# Patient Record
Sex: FEMALE | Race: WHITE | NOT HISPANIC OR LATINO | Employment: FULL TIME | URBAN - METROPOLITAN AREA
[De-identification: names, ages, dates, MRNs, and addresses within clinical notes are randomized per-mention and may not be internally consistent; named-entity substitution may affect disease eponyms.]

---

## 2017-06-13 ENCOUNTER — OFFICE VISIT (OUTPATIENT)
Dept: URGENT CARE | Facility: CLINIC | Age: 32
End: 2017-06-13
Attending: EMERGENCY MEDICINE
Payer: COMMERCIAL

## 2017-06-13 ENCOUNTER — TRANSCRIBE ORDERS (OUTPATIENT)
Dept: URGENT CARE | Facility: CLINIC | Age: 32
End: 2017-06-13

## 2017-06-13 ENCOUNTER — APPOINTMENT (EMERGENCY)
Dept: RADIOLOGY | Facility: HOSPITAL | Age: 32
End: 2017-06-13
Payer: COMMERCIAL

## 2017-06-13 ENCOUNTER — HOSPITAL ENCOUNTER (EMERGENCY)
Facility: HOSPITAL | Age: 32
Discharge: HOME | End: 2017-06-13
Attending: EMERGENCY MEDICINE
Payer: COMMERCIAL

## 2017-06-13 ENCOUNTER — OFFICE VISIT (OUTPATIENT)
Dept: URGENT CARE | Facility: CLINIC | Age: 32
End: 2017-06-13
Payer: COMMERCIAL

## 2017-06-13 VITALS
HEART RATE: 75 BPM | OXYGEN SATURATION: 100 % | RESPIRATION RATE: 16 BRPM | WEIGHT: 122 LBS | SYSTOLIC BLOOD PRESSURE: 103 MMHG | DIASTOLIC BLOOD PRESSURE: 55 MMHG | TEMPERATURE: 98.1 F

## 2017-06-13 DIAGNOSIS — R07.9 CHEST PAIN, UNSPECIFIED: Primary | ICD-10-CM

## 2017-06-13 DIAGNOSIS — R07.9 CHEST PAIN, UNSPECIFIED: ICD-10-CM

## 2017-06-13 DIAGNOSIS — R09.1 PLEURISY: Primary | ICD-10-CM

## 2017-06-13 LAB — DEPRECATED D DIMER PPP: <270 NG/ML (FEU) (ref 0–424)

## 2017-06-13 PROCEDURE — 85379 FIBRIN DEGRADATION QUANT: CPT | Performed by: EMERGENCY MEDICINE

## 2017-06-13 PROCEDURE — 96374 THER/PROPH/DIAG INJ IV PUSH: CPT

## 2017-06-13 PROCEDURE — 93005 ELECTROCARDIOGRAM TRACING: CPT | Performed by: EMERGENCY MEDICINE

## 2017-06-13 PROCEDURE — 71020 HB CHEST X-RAY 2VW FRONTAL&LATL: CPT

## 2017-06-13 PROCEDURE — 93005 ELECTROCARDIOGRAM TRACING: CPT

## 2017-06-13 PROCEDURE — 99203 OFFICE O/P NEW LOW 30 MIN: CPT

## 2017-06-13 PROCEDURE — 36415 COLL VENOUS BLD VENIPUNCTURE: CPT | Performed by: EMERGENCY MEDICINE

## 2017-06-13 PROCEDURE — 99285 EMERGENCY DEPT VISIT HI MDM: CPT

## 2017-06-13 RX ORDER — KETOROLAC TROMETHAMINE 30 MG/ML
30 INJECTION, SOLUTION INTRAMUSCULAR; INTRAVENOUS ONCE
Status: COMPLETED | OUTPATIENT
Start: 2017-06-13 | End: 2017-06-13

## 2017-06-13 RX ORDER — KETOROLAC TROMETHAMINE 10 MG/1
10 TABLET, FILM COATED ORAL EVERY 6 HOURS PRN
Qty: 20 TABLET | Refills: 0 | Status: SHIPPED | OUTPATIENT
Start: 2017-06-13 | End: 2019-06-18

## 2017-06-13 RX ORDER — CETIRIZINE HYDROCHLORIDE 10 MG/1
10 TABLET ORAL DAILY
COMMUNITY
End: 2018-10-08

## 2017-06-13 RX ADMIN — KETOROLAC TROMETHAMINE 30 MG: 30 INJECTION, SOLUTION INTRAMUSCULAR at 21:59

## 2017-06-14 LAB
ATRIAL RATE: 70 BPM
ATRIAL RATE: 85 BPM
P AXIS: 65 DEGREES
P AXIS: 65 DEGREES
PR INTERVAL: 154 MS
PR INTERVAL: 160 MS
QRS AXIS: 100 DEGREES
QRS AXIS: 97 DEGREES
QRSD INTERVAL: 76 MS
QRSD INTERVAL: 78 MS
QT INTERVAL: 360 MS
QT INTERVAL: 376 MS
QTC INTERVAL: 406 MS
QTC INTERVAL: 428 MS
T WAVE AXIS: 59 DEGREES
T WAVE AXIS: 64 DEGREES
VENTRICULAR RATE: 70 BPM
VENTRICULAR RATE: 85 BPM

## 2017-09-16 ENCOUNTER — OFFICE VISIT (OUTPATIENT)
Dept: URGENT CARE | Facility: CLINIC | Age: 32
End: 2017-09-16
Payer: COMMERCIAL

## 2017-09-16 PROCEDURE — 99213 OFFICE O/P EST LOW 20 MIN: CPT

## 2018-01-04 ENCOUNTER — OFFICE VISIT (OUTPATIENT)
Dept: URGENT CARE | Facility: CLINIC | Age: 33
End: 2018-01-04
Payer: COMMERCIAL

## 2018-01-04 PROCEDURE — 99213 OFFICE O/P EST LOW 20 MIN: CPT

## 2018-01-10 NOTE — PROGRESS NOTES
Assessment   1  Acute bronchitis (466 0) (J20 9)    Plan   Acute bronchitis    · Amoxicillin-Pot Clavulanate 875-125 MG Oral Tablet; TAKE 1 TABLET EVERY 12    HOURS DAILY    Discussion/Summary   Discussion Summary:    Start Augmentin and take as directed  Concern for more bacterial infections since the fever was noted today for the first time  Continue Tylenol as needed for headache or pressure  Recommend over-the-counter Mucinex to help with symptoms  Increase fluids and drink plenty of water  Medication Side Effects Reviewed: Possible side effects of new medications were reviewed with the patient/guardian today  Understands and agrees with treatment plan: The treatment plan was reviewed with the patient/guardian  The patient/guardian understands and agrees with the treatment plan      Chief Complaint   1  Cold Symptoms  Chief Complaint Free Text Note Form: C/O sinus pressure, post nasal drip, loss of voice and harsh cough x 5 days      History of Present Illness   HPI: 82-year-old female here with complaint of sinus pressure, postnasal drip and loss of voice and harsh cough for the last 5 days  If she has been getting progressively worse and today started with a fever  Cough is getting more productive  Hospital Based Practices Required Assessment:      Pain Assessment      the patient states they have pain  The pain is located in the throat  The patient describes the pain as aching  Abuse And Domestic Violence Screen   Domestic violence screen not done today  Reason DV Screen not done: family members present     Cold Symptoms: Yeni Sheets presents with complaints of cold symptoms  Associated symptoms include nasal congestion,-- post nasal drainage,-- productive cough,-- facial pressure,-- facial pain,-- headache,-- fatigue,-- fever-- and-- chills, but-- no ear pain,-- no wheezing,-- no nausea-- and-- no vomiting        Review of Systems   Focused-Female:      Constitutional: fever,-- feeling poorly,-- chills-- and-- feeling tired  ENT: nasal discharge-- and-- hoarseness, but-- as noted in HPI  Cardiovascular: no complaints of slow or fast heart rate, no chest pain, no palpitations, no leg claudication or lower extremity edema  Respiratory: cough, but-- as noted in HPI,-- no shortness of breath-- and-- no wheezing  ROS Reviewed:    ROS reviewed  Active Problems   1  Chronic interstitial cystitis (595 1) (N30 10)    Past Medical History   1  History of Flu-like symptoms (780 99) (R68 89)   2  History of chest pain (V13 89) (Z87 898)   3  History of dysuria (V13 00) (Z87 898)   4  History of lymphadenitis (V13 89) (Z87 898)   5  History of nausea (V12 79) (Z87 898)   6  History of pleurisy (V12 69) (Z87 09)   7  History of sinusitis (V12 69) (Z87 09)   8  History of urinary tract infection (V13 02) (Z87 440)   9  No pertinent past medical history   10  History of No pertinent past surgical history   11  History of Palpitations (785 1) (R00 2)   12  History of Skin infection (686 9) (L08 9)  Active Problems And Past Medical History Reviewed: The active problems and past medical history were reviewed and updated today  Family History   Mother    1  No pertinent family history  Father    2  No pertinent family history  Family History Reviewed: The family history was reviewed and updated today  Social History    · Never a smoker   · Non-smoker (V49 89) (Z78 9)   · Social alcohol use (Z78 9)  Social History Reviewed: The social history was reviewed and updated today  The social history was reviewed and is unchanged  Surgical History   1  History of Incisional Breast Biopsy  Surgical History Reviewed: The surgical history was reviewed and updated today  Current Meds    1  CeleXA 10 MG Oral Tablet; Therapy: (Recorded:36Mzk2101) to Recorded  Medication List Reviewed: The medication list was reviewed and updated today  Allergies   1  Thorazine TABS    Vitals   Signs   Recorded: 03DPR4942 02:07PM   Temperature: 97 3 F  Heart Rate: 76  Respiration: 18  Systolic: 245  Diastolic: 68  Height: 5 ft 4 in  Weight: 122 lb 8 96 oz  BMI Calculated: 21 04  BSA Calculated: 1 6  O2 Saturation: 98    Physical Exam        Constitutional      General appearance: No acute distress, well appearing and well nourished  Ears, Nose, Mouth, and Throat      External inspection of ears and nose: Normal        Otoscopic examination: Tympanic membranes translucent with normal light reflex  Canals patent without erythema  Nasal mucosa, septum, and turbinates: Abnormal   There was a mucoid discharge from both nares  The bilateral nasal mucosa was red  Oropharynx: Abnormal   The posterior pharynx was erythematous, but-- did not have an exudate  Pulmonary      Respiratory effort: No increased work of breathing or signs of respiratory distress  Auscultation of lungs: Clear to auscultation  Cardiovascular      Auscultation of heart: Normal rate and rhythm, normal S1 and S2, without murmurs         Examination of extremities for edema and/or varicosities: Normal        Signatures    Electronically signed by : Nitza Purvis PAC; Jan 4 2018  2:40PM EST                       (Author)     Electronically signed by : CRISTOPHER Aquino ; Jan 9 2018 10:11AM EST                       (Co-author)

## 2018-01-17 ENCOUNTER — TRANSCRIBE ORDERS (OUTPATIENT)
Dept: URGENT CARE | Facility: CLINIC | Age: 33
End: 2018-01-17

## 2018-01-17 ENCOUNTER — OFFICE VISIT (OUTPATIENT)
Dept: URGENT CARE | Facility: CLINIC | Age: 33
End: 2018-01-17
Payer: COMMERCIAL

## 2018-01-17 ENCOUNTER — APPOINTMENT (OUTPATIENT)
Dept: LAB | Facility: CLINIC | Age: 33
End: 2018-01-17
Payer: COMMERCIAL

## 2018-01-17 DIAGNOSIS — R50.9 FEVER: ICD-10-CM

## 2018-01-17 PROCEDURE — 87798 DETECT AGENT NOS DNA AMP: CPT

## 2018-01-17 PROCEDURE — 99213 OFFICE O/P EST LOW 20 MIN: CPT

## 2018-01-18 NOTE — PROGRESS NOTES
Assessment   1  Viral illness (951 14) (B34 9)    Discussion/Summary   Discussion Summary:    S diagnosis of viral illness did obtain nasal swab for RSV and influenza will send lab instructed patient to rest may use Tylenol or Motrin as needed for body aches on discussed Tamiflu patient does not want this at this time and will follow up PCP in 3-5 days  Medication Side Effects Reviewed: Possible side effects of new medications were reviewed with the patient/guardian today  Understands and agrees with treatment plan: The treatment plan was reviewed with the patient/guardian  The patient/guardian understands and agrees with the treatment plan    Counseling Documentation With Imm: The patient was counseled regarding instructions for management,-- patient and family education,-- importance of compliance with treatment  total time of encounter was 25 minutes-- and-- 10 minutes was spent counseling  Follow Up Instructions: Follow Up with your Primary Care Provider in 3-5 days  If your symptoms worsen, go to the nearest [place] [address] Emergency Department  Chief Complaint   1  Fever  Chief Complaint Free Text Note Form: C/O fever, body aches and fever as high as 100 2 since this AM  Pt was seen 1/4 for Bronchitis and treated with Augmentin   Pt started with diarrhea and was switched by her PCP to Amoxicillin   Pt took 2 days and stopped because of GI symptoms  Pt's cough and congestion persist  Pt used the [facility] and was told to be seen for a Flu Swab  Pt did not have the Flu vaccine        History of Present Illness   HPI: 21-year-old female at urgent care with chief complaint of fevers as high as 100 5 has used over-the-counter Tylenol no body aches cough nasal congestion for 1 day was treated beginning the month for bronchitis did not complete treatment for reports improvement in those symptoms    Hospital Based Practices Required Assessment:      Pain Assessment      the patient states they have pain  The pain is located in the muscular  The patient describes the pain as aching  Abuse And Domestic Violence Screen   Domestic violence screen not done today  Reason DV Screen not done: family present       Depression And Suicide Screen  No, the patient has not had thoughts of hurting themself  No, the patient has not felt depressed in the past 7 days  Readiness To Learn: Receptive  Barriers To Learning: none  Preferred Learning: verbal      Education Completed: disease/condition,-- medications-- and-- further treatment/follow-up      Teaching Method: verbal      Person Taught: patient      Evaluation Of Learning: verbalized/demonstrated understanding    Fever: Raya Clemens presents with complaints of fever  Associated symptoms include cough-- and-- rhinorrhea, but-- no sore throat,-- no ear pain,-- no skin redness,-- no skin swelling,-- no rash,-- no headache,-- no decreased appetite,-- no nausea,-- no vomiting,-- no diarrhea,-- no abdominal pain,-- no dysuria,-- no mouth sores,-- no swollen neck glands,-- no neck stiffness,-- no nasal congestion,-- no facial pain,-- no eye redness,-- no wheezing,-- no dyspnea,-- no chest pain,-- no body aches,-- no flank pain,-- no joint pain,-- no pain with weight bearing,-- no weakness,-- no fatigue-- and-- no weight loss  Review of Systems   Focused-Female:      Constitutional: fever, but-- as noted in HPI       ENT: nasal discharge, but-- as noted in HPI  Cardiovascular: no complaints of slow or fast heart rate, no chest pain, no palpitations, no leg claudication or lower extremity edema  Respiratory: cough, but-- as noted in HPI  Breasts: no complaints of breast pain, breast lump or nipple discharge  Gastrointestinal: no complaints of abdominal pain, no constipation, no nausea or diarrhea, no vomiting, no bloody stools        Genitourinary: no complaints of dysuria, no incontinence, no pelvic pain, no dysmenorrhea, no vaginal discharge or abnormal vaginal bleeding  Musculoskeletal: no complaints of arthralgia, no myalgia, no joint swelling or stiffness, no limb pain or swelling  Integumentary: no complaints of skin rash or lesion, no itching or dry skin, no skin wounds  Neurological: no complaints of headache, no confusion, no numbness or tingling, no dizziness or fainting  ROS Reviewed:    ROS reviewed  Active Problems   1  Chronic interstitial cystitis (595 1) (N30 10)    Past Medical History   1  History of Flu-like symptoms (780 99) (R68 89)   2  History of acute bronchitis (V12 69) (Z87 09)   3  History of chest pain (V13 89) (Z87 898)   4  History of dysuria (V13 00) (Z87 898)   5  History of lymphadenitis (V13 89) (Z87 898)   6  History of nausea (V12 79) (Z87 898)   7  History of pleurisy (V12 69) (Z87 09)   8  History of sinusitis (V12 69) (Z87 09)   9  History of urinary tract infection (V13 02) (Z87 440)   10  No pertinent past medical history   11  History of No pertinent past surgical history   12  History of Palpitations (785 1) (R00 2)   13  History of Skin infection (686 9) (L08 9)  Active Problems And Past Medical History Reviewed: The active problems and past medical history were reviewed and updated today  Family History   Mother    1  No pertinent family history  Father    2  No pertinent family history  Family History Reviewed: The family history was reviewed and updated today  Social History    · Never a smoker   · Non-smoker (V49 89) (Z78 9)   · Social alcohol use (Z78 9)  Social History Reviewed: The social history was reviewed and updated today  The social history was reviewed and is unchanged  Surgical History   1  History of Incisional Breast Biopsy  Surgical History Reviewed: The surgical history was reviewed and updated today  Current Meds    1  CeleXA 10 MG Oral Tablet;      Therapy: (Recorded:56Cyg7989) to Recorded  Medication List Reviewed: The medication list was reviewed and updated today  Allergies   1  Thorazine TABS    Vitals   Signs   Recorded: 54CPG2719 02:40PM   Temperature: 100 2 F  Heart Rate: 100  Respiration: 18  Systolic: 741  Diastolic: 60  Height: 5 ft 4 in  Weight: 122 lb 8 96 oz  BMI Calculated: 21 04  BSA Calculated: 1 6  O2 Saturation: 98    Physical Exam        Constitutional      General appearance: No acute distress, well appearing and well nourished  Eyes      Conjunctiva and lids: No swelling, erythema or discharge  Pupils and irises: Equal, round and reactive to light  Ears, Nose, Mouth, and Throat      External inspection of ears and nose: Normal        Otoscopic examination: Tympanic membranes translucent with normal light reflex  Canals patent without erythema  Nasal mucosa, septum, and turbinates: Abnormal   normal nasal septum,-- no intranasal masses or polyps-- and-- normal nasal turbinates  There was a mucoid discharge from both nares  The bilateral nasal mucosa was boggy  Oropharynx: Abnormal  -- PND  Pulmonary      Respiratory effort: No increased work of breathing or signs of respiratory distress  Auscultation of lungs: Clear to auscultation  Cardiovascular      Palpation of heart: Normal PMI, no thrills  Auscultation of heart: Normal rate and rhythm, normal S1 and S2, without murmurs  Lymphatic      Palpation of lymph nodes in neck: No lymphadenopathy         Musculoskeletal      Gait and station: Normal        Psychiatric      Orientation to person, place, and time: Normal        Mood and affect: Normal        Signatures    Electronically signed by : Noble Osorio NP; Jan 17 2018  3:02PM EST                       (Author)     Electronically signed by : CRISTOPHER Hickey ; Jan 17 2018  3:46PM EST                       (Co-author)

## 2018-01-19 LAB
FLUAV AG SPEC QL: DETECTED
FLUBV AG SPEC QL: ABNORMAL
RSV B RNA SPEC QL NAA+PROBE: ABNORMAL

## 2018-01-23 VITALS
RESPIRATION RATE: 18 BRPM | BODY MASS INDEX: 20.92 KG/M2 | SYSTOLIC BLOOD PRESSURE: 108 MMHG | TEMPERATURE: 100.2 F | HEIGHT: 64 IN | OXYGEN SATURATION: 98 % | DIASTOLIC BLOOD PRESSURE: 60 MMHG | WEIGHT: 122.56 LBS | HEART RATE: 100 BPM

## 2018-01-23 VITALS
OXYGEN SATURATION: 98 % | HEIGHT: 64 IN | TEMPERATURE: 97.3 F | DIASTOLIC BLOOD PRESSURE: 68 MMHG | WEIGHT: 122.56 LBS | SYSTOLIC BLOOD PRESSURE: 112 MMHG | RESPIRATION RATE: 18 BRPM | BODY MASS INDEX: 20.92 KG/M2 | HEART RATE: 76 BPM

## 2018-10-08 ENCOUNTER — OFFICE VISIT (OUTPATIENT)
Dept: URGENT CARE | Facility: CLINIC | Age: 33
End: 2018-10-08
Payer: COMMERCIAL

## 2018-10-08 DIAGNOSIS — F41.0 PANIC ATTACK: Primary | ICD-10-CM

## 2018-10-08 PROCEDURE — 99213 OFFICE O/P EST LOW 20 MIN: CPT | Performed by: PHYSICIAN ASSISTANT

## 2018-10-08 NOTE — PROGRESS NOTES
[facility]        NAME: Julio Muñiz is a 34 y o  female  : 1985    MRN: [id number]  DATE: 2018  TIME: 1:59 PM    Assessment and Plan   Panic attack [F41 0]  1  Panic attack       Signed AMA    Patient Instructions     Proceed to ER for further evaluation  Chief Complaint     Chief Complaint   Patient presents with    Anxiety     headache, leg pain urinary burning dizziness  History of Present Illness       34 y o  Female with pmhx of anxiety presents with panic attack  Pt states it started this morning  C/o dizziness, lightheadedness since this morning  Pt states at work she gets mad at thing she feels like she should not be getting mad at  States she is having a bad day  Does not remember if she took her celexa this morning and is unsure if she should take it  Says she usually goes to the ER when this happens  Denies suicidal or homicidal ideations  Denies chest pain, palpitations, SOB  Denies life changing events  Review of Systems   Review of Systems   Constitutional: Negative for chills, fatigue and fever  HENT: Negative for congestion, ear pain, sinus pain, sore throat and trouble swallowing  Eyes: Negative for pain, discharge and redness  Respiratory: Negative for cough, chest tightness, shortness of breath and wheezing  Cardiovascular: Negative for chest pain, palpitations and leg swelling  Gastrointestinal: Negative for abdominal pain, diarrhea, nausea and vomiting  Genitourinary: Positive for dysuria and frequency  Musculoskeletal: Negative for arthralgias, joint swelling and myalgias  Skin: Negative for rash  Neurological: Positive for dizziness and headaches  Negative for weakness, light-headedness and numbness  Psychiatric/Behavioral: Negative for self-injury and suicidal ideas          Panic attack         Current Medications       Current Outpatient Prescriptions:     ketorolac (TORADOL) 10 mg tablet, Take 1 tablet by mouth every 6 (six) hours as needed (pain) for up to 5 days, Disp: 20 tablet, Rfl: 0    Current Allergies     Allergies as of 10/08/2018 - Reviewed 10/08/2018   Allergen Reaction Noted    Thorazine [chlorpromazine] Other (See Comments) 06/13/2017            The following portions of the patient's history were reviewed and updated as appropriate: allergies, current medications, past family history, past medical history, past social history, past surgical history and problem list      Past Medical History:   Diagnosis Date    Anxiety     Environmental and seasonal allergies     Migraine     Palpitations        Past Surgical History:   Procedure Laterality Date    BREAST SURGERY         No family history on file  Medications have been verified  Objective   There were no vitals taken for this visit  Physical Exam     Physical Exam   Constitutional: She is oriented to person, place, and time  She appears well-developed and well-nourished  No distress  HENT:   Head: Normocephalic  Right Ear: External ear normal    Left Ear: External ear normal    Mouth/Throat: Oropharynx is clear and moist    Eyes: Pupils are equal, round, and reactive to light  Conjunctivae and EOM are normal    Neck: Normal range of motion  Neck supple  Cardiovascular: Normal rate, regular rhythm and normal heart sounds  No murmur heard  Pulmonary/Chest: Effort normal and breath sounds normal  No respiratory distress  She has no wheezes  Abdominal: Soft  Bowel sounds are normal  There is no tenderness  Musculoskeletal: Normal range of motion  Lymphadenopathy:     She has no cervical adenopathy  Neurological: She is alert and oriented to person, place, and time  She has normal reflexes  Skin: Skin is warm and dry  Psychiatric: Her speech is normal  Judgment and thought content normal  Her mood appears anxious  She is agitated  Cognition and memory are normal  She expresses no homicidal and no suicidal ideation   She expresses no suicidal plans and no homicidal plans  Flat Affect   Nursing note and vitals reviewed

## 2018-10-09 ENCOUNTER — APPOINTMENT (EMERGENCY)
Dept: RADIOLOGY | Facility: HOSPITAL | Age: 33
End: 2018-10-09
Payer: COMMERCIAL

## 2018-10-09 ENCOUNTER — HOSPITAL ENCOUNTER (EMERGENCY)
Facility: HOSPITAL | Age: 33
Discharge: HOME | End: 2018-10-09
Attending: EMERGENCY MEDICINE | Admitting: EMERGENCY MEDICINE
Payer: COMMERCIAL

## 2018-10-09 VITALS
WEIGHT: 122.58 LBS | SYSTOLIC BLOOD PRESSURE: 118 MMHG | HEART RATE: 67 BPM | OXYGEN SATURATION: 99 % | DIASTOLIC BLOOD PRESSURE: 72 MMHG | TEMPERATURE: 98.4 F | BODY MASS INDEX: 23.14 KG/M2 | HEIGHT: 61 IN | RESPIRATION RATE: 18 BRPM

## 2018-10-09 DIAGNOSIS — R07.89 CHEST PRESSURE: Primary | ICD-10-CM

## 2018-10-09 LAB
ANION GAP SERPL CALCULATED.3IONS-SCNC: 6 MMOL/L (ref 4–13)
BACTERIA UR QL AUTO: ABNORMAL /HPF
BASOPHILS # BLD AUTO: 0.01 THOUSANDS/ΜL (ref 0–0.1)
BASOPHILS NFR BLD AUTO: 0 % (ref 0–1)
BILIRUB UR QL STRIP: NEGATIVE
BUN SERPL-MCNC: 11 MG/DL (ref 5–25)
CALCIUM SERPL-MCNC: 9.1 MG/DL (ref 8.3–10.1)
CHLORIDE SERPL-SCNC: 105 MMOL/L (ref 100–108)
CLARITY UR: ABNORMAL
CO2 SERPL-SCNC: 26 MMOL/L (ref 21–32)
COLOR UR: YELLOW
CREAT SERPL-MCNC: 0.62 MG/DL (ref 0.6–1.3)
DEPRECATED D DIMER PPP: <270 NG/ML (FEU) (ref 0–424)
EOSINOPHIL # BLD AUTO: 0.1 THOUSAND/ΜL (ref 0–0.61)
EOSINOPHIL NFR BLD AUTO: 2 % (ref 0–6)
ERYTHROCYTE [DISTWIDTH] IN BLOOD BY AUTOMATED COUNT: 11.9 % (ref 11.6–15.1)
EXT PREG TEST URINE: NEGATIVE
GFR SERPL CREATININE-BSD FRML MDRD: 121 ML/MIN/1.73SQ M
GLUCOSE SERPL-MCNC: 90 MG/DL (ref 65–140)
GLUCOSE UR STRIP-MCNC: NEGATIVE MG/DL
HCT VFR BLD AUTO: 36.8 % (ref 34.8–46.1)
HGB BLD-MCNC: 13.2 G/DL (ref 11.5–15.4)
HGB UR QL STRIP.AUTO: NEGATIVE
IMM GRANULOCYTES # BLD AUTO: 0.02 THOUSAND/UL (ref 0–0.2)
IMM GRANULOCYTES NFR BLD AUTO: 0 % (ref 0–2)
KETONES UR STRIP-MCNC: NEGATIVE MG/DL
LEUKOCYTE ESTERASE UR QL STRIP: ABNORMAL
LYMPHOCYTES # BLD AUTO: 1.72 THOUSANDS/ΜL (ref 0.6–4.47)
LYMPHOCYTES NFR BLD AUTO: 36 % (ref 14–44)
MAGNESIUM SERPL-MCNC: 1.8 MG/DL (ref 1.6–2.6)
MCH RBC QN AUTO: 31 PG (ref 26.8–34.3)
MCHC RBC AUTO-ENTMCNC: 35.9 G/DL (ref 31.4–37.4)
MCV RBC AUTO: 86 FL (ref 82–98)
MONOCYTES # BLD AUTO: 0.47 THOUSAND/ΜL (ref 0.17–1.22)
MONOCYTES NFR BLD AUTO: 10 % (ref 4–12)
NEUTROPHILS # BLD AUTO: 2.52 THOUSANDS/ΜL (ref 1.85–7.62)
NEUTS SEG NFR BLD AUTO: 52 % (ref 43–75)
NITRITE UR QL STRIP: NEGATIVE
NON-SQ EPI CELLS URNS QL MICRO: ABNORMAL /HPF
NRBC BLD AUTO-RTO: 0 /100 WBCS
PH UR STRIP.AUTO: 6.5 [PH] (ref 4.5–8)
PLATELET # BLD AUTO: 242 THOUSANDS/UL (ref 149–390)
PMV BLD AUTO: 9 FL (ref 8.9–12.7)
POTASSIUM SERPL-SCNC: 4.1 MMOL/L (ref 3.5–5.3)
PROT UR STRIP-MCNC: NEGATIVE MG/DL
RBC # BLD AUTO: 4.26 MILLION/UL (ref 3.81–5.12)
RBC #/AREA URNS AUTO: ABNORMAL /HPF
SODIUM SERPL-SCNC: 137 MMOL/L (ref 136–145)
SP GR UR STRIP.AUTO: 1.01 (ref 1–1.03)
TROPONIN I SERPL-MCNC: <0.02 NG/ML
TROPONIN I SERPL-MCNC: <0.02 NG/ML
UROBILINOGEN UR QL STRIP.AUTO: 0.2 E.U./DL
WBC # BLD AUTO: 4.84 THOUSAND/UL (ref 4.31–10.16)
WBC #/AREA URNS AUTO: ABNORMAL /HPF

## 2018-10-09 PROCEDURE — 80048 BASIC METABOLIC PNL TOTAL CA: CPT | Performed by: EMERGENCY MEDICINE

## 2018-10-09 PROCEDURE — 81001 URINALYSIS AUTO W/SCOPE: CPT | Performed by: EMERGENCY MEDICINE

## 2018-10-09 PROCEDURE — 36415 COLL VENOUS BLD VENIPUNCTURE: CPT | Performed by: EMERGENCY MEDICINE

## 2018-10-09 PROCEDURE — 99284 EMERGENCY DEPT VISIT MOD MDM: CPT

## 2018-10-09 PROCEDURE — 85025 COMPLETE CBC W/AUTO DIFF WBC: CPT | Performed by: EMERGENCY MEDICINE

## 2018-10-09 PROCEDURE — 71046 X-RAY EXAM CHEST 2 VIEWS: CPT

## 2018-10-09 PROCEDURE — 93005 ELECTROCARDIOGRAM TRACING: CPT

## 2018-10-09 PROCEDURE — 85379 FIBRIN DEGRADATION QUANT: CPT | Performed by: EMERGENCY MEDICINE

## 2018-10-09 PROCEDURE — 83735 ASSAY OF MAGNESIUM: CPT | Performed by: EMERGENCY MEDICINE

## 2018-10-09 PROCEDURE — 81025 URINE PREGNANCY TEST: CPT | Performed by: EMERGENCY MEDICINE

## 2018-10-09 PROCEDURE — 84484 ASSAY OF TROPONIN QUANT: CPT | Performed by: EMERGENCY MEDICINE

## 2018-10-09 RX ORDER — CITALOPRAM 10 MG/1
10 TABLET ORAL DAILY
COMMUNITY
End: 2019-06-18

## 2018-10-09 NOTE — DISCHARGE INSTRUCTIONS
Chest Pain   WHAT YOU NEED TO KNOW:   Chest pain can be caused by a range of conditions, from not serious to life-threatening  Chest pain can be a symptom of a digestive problem, such as acid reflux or a stomach ulcer  An anxiety attack or a strong emotion, such as anger, can also cause chest pain  Infection, inflammation, or a fracture in the bones or cartilage in your chest can cause pain or discomfort  Sometimes chest pain or pressure is caused by poor blood flow to your heart (angina)  Chest pain may also be caused by life-threatening conditions such as a heart attack or blood clot in your lungs  DISCHARGE INSTRUCTIONS:   Call 911 if:   · You have any of the following signs of a heart attack:      ¨ Squeezing, pressure, or pain in your chest that lasts longer than 5 minutes or returns    ¨ Discomfort or pain in your back, neck, jaw, stomach, or arm     ¨ Trouble breathing    ¨ Nausea or vomiting    ¨ Lightheadedness or a sudden cold sweat, especially with chest pain or trouble breathing    Return to the emergency department if:   · You have chest discomfort that gets worse, even with medicine  · You cough or vomit blood  · Your bowel movements are black or bloody  · You cannot stop vomiting, or it hurts to swallow  Contact your healthcare provider if:   · You have questions or concerns about your condition or care  Medicines:   · Medicines  may be given to treat the cause of your chest pain  Examples include pain medicine, anxiety medicine, or medicines to increase blood flow to your heart  · Do not take certain medicines without asking your healthcare provider first   These include NSAIDs, herbal or vitamin supplements, or hormones (estrogen or progestin)  · Take your medicine as directed  Contact your healthcare provider if you think your medicine is not helping or if you have side effects  Tell him or her if you are allergic to any medicine   Keep a list of the medicines, vitamins, and herbs you take  Include the amounts, and when and why you take them  Bring the list or the pill bottles to follow-up visits  Carry your medicine list with you in case of an emergency  Follow up with your healthcare provider within 72 hours, or as directed: You may need to return for more tests to find the cause of your chest pain  You may be referred to a specialist, such as a cardiologist or gastroenterologist  Write down your questions so you remember to ask them during your visits  Healthy living tips: The following are general healthy guidelines  If your chest pain is caused by a heart problem, your healthcare provider will give you specific guidelines to follow  · Do not smoke  Nicotine and other chemicals in cigarettes and cigars can cause lung and heart damage  Ask your healthcare provider for information if you currently smoke and need help to quit  E-cigarettes or smokeless tobacco still contain nicotine  Talk to your healthcare provider before you use these products  · Eat a variety of healthy, low-fat foods  Healthy foods include fruits, vegetables, whole-grain breads, low-fat dairy products, beans, lean meats, and fish  Ask for more information about a heart healthy diet  · Ask about activity  Your healthcare provider will tell you which activities to limit or avoid  Ask when you can drive, return to work, and have sex  Ask about the best exercise plan for you  · Maintain a healthy weight  Ask your healthcare provider how much you should weigh  Ask him or her to help you create a weight loss plan if you are overweight  · Get the flu and pneumonia vaccines  All adults should get the influenza (flu) vaccine  Get it every year as soon as it becomes available  The pneumococcal vaccine is given to adults aged 72 years or older  The vaccine is given every 5 years to prevent pneumococcal disease, such as pneumonia    © 2017 [employer] Information is for End User's use only and may not be sold, redistributed or otherwise used for commercial purposes  All illustrations and images included in CareNotes® are the copyrighted property of A D [employer] M [employer] Inc  or [employer]  The above information is an  only  It is not intended as medical advice for individual conditions or treatments  Talk to your doctor, nurse or pharmacist before following any medical regimen to see if it is safe and effective for you

## 2018-10-09 NOTE — ED PROCEDURE NOTE
PROCEDURE  ECG 12 Lead Documentation  Date/Time: 10/9/2018 4:45 AM  Performed by: Sergo Joseph  Authorized by: Sergo Joseph     Indications / Diagnosis:  Psychiatric clearancee  ECG reviewed by me, the ED Provider: yes    Patient location:  ED  Previous ECG:     Comparison to cardiac monitor: Yes    Interpretation:     Interpretation: normal    Rate:     ECG rate:  79    ECG rate assessment: normal    Rhythm:     Rhythm: sinus rhythm    Ectopy:     Ectopy: none    QRS:     QRS axis:  Normal    QRS intervals:  Normal  Conduction:     Conduction: normal    ST segments:     ST segments:  Normal  T waves:     T waves: normal           Addy Gillespie MD  10/09/18 [id number]

## 2018-10-09 NOTE — ED PROVIDER NOTES
History  Chief Complaint   Patient presents with    Anxiety     patient states that she was seen at urgent care yesterday after pulling muscle in right leg  patient also states cp/tightness since yesterday     Patient: [facility]  33 y o /female  YOB: 1985  MRN: [id number]  PCP: Susanna Hare DO  Date of evaluation: 10/9/2018    (N B   Vamsi Rakerry may have been used in the preparation of this document  Occasional wrong word or "sound-alike" substitutions may have occurred due to the inherent limitations of voice recognition software  Interpretation should be guided by context )    This patient presents to the emergency department with a chief complaint of anxiety  This is associated with trouble sleeping, with only 2 hours of sleep  She was seen at an urgent care the day before presentation for a complaint of groin pain  (She now believes that that was related to a pulled muscle )  At the urgent care they told her they did not have  the testing capability to rule out DVT  They encouraged her to come to the emergency department for further evaluation  They had her sign out against medical advice  She now feels very anxious about the possibility of having a DVT  She notes that she had a car ride, which lasted 3 hours or less  She has no recent immobilization, surgery, smoking history, medications that are hormonal, personal or family history of DVT or pulmonary embolus  She admits to chest pressure, like someone sitting on chest, since 10:00 yesterday  The pressure is on and off, lasting always less than 2 hours at a time  She response to the pressure by going into a quiet room to try to calm down  This sometimes is effective  She denies dyspnea  She admits that occasionally her heart feels that is beating fast, but that this is not especially abnormal for her    She admits to some abdominal pain in the left lower quadrant, which did not strike her as unusual  She states that within the past couple of weeks she has been getting over a GI bug  She also states that when she gets anxious, her digestive system acts up  She denies any fever  She has no vomiting  History provided by:  Patient  Anxiety   Presenting symptoms: no agitation, no delusions, no depression, no disorganized speech, no disorganized thought process, no hallucinations, no homicidal ideas and no suicidal thoughts    Degree of incapacity (severity):  Mild  Timing:  Constant  Progression:  Unchanged  Chronicity:  Recurrent  Context: not alcohol use, not drug abuse and not recent medication change    Associated symptoms: anxiety and chest pain (pressure)    Associated symptoms: no abdominal pain, no decreased need for sleep and no euphoric mood    Chest Pain   Pain location:  Substernal area  Pain quality: pressure    Pain radiates to:  Does not radiate  Pain radiates to the back: no    Pain severity:  Mild  Onset quality:  Gradual  Timing:  Intermittent  Progression:  Unchanged  Chronicity:  New  Context: at rest    Context: not breathing, not lifting, no movement and not raising an arm    Relieved by:  Nothing  Associated symptoms: anxiety    Associated symptoms: no abdominal pain, no back pain, no cough, no dysphagia, no fever, no lower extremity edema (and no lower extremity pain at present), no nausea, no palpitations, no PND, no shortness of breath, not vomiting and no weakness        Prior to Admission Medications   Prescriptions Last Dose Informant Patient Reported?  Taking?   citalopram (CELEXA) 10 mg tablet   Yes Yes   Sig: Take 10 mg by mouth daily   ketorolac (TORADOL) 10 mg tablet   No No   Sig: Take 1 tablet by mouth every 6 (six) hours as needed (pain) for up to 5 days      Facility-Administered Medications: None       Past Medical History:   Diagnosis Date    Anxiety     Environmental and seasonal allergies     Migraine     Palpitations        Past Surgical History:   Procedure Laterality Date    BREAST SURGERY      WISDOM TOOTH EXTRACTION         History reviewed  No pertinent family history  I have reviewed and agree with the history as documented  Social History   Substance Use Topics    Smoking status: Never Smoker    Smokeless tobacco: Never Used    Alcohol use Yes      Comment: socially        Review of Systems   Constitutional: Negative for chills and fever  HENT: Negative for hearing loss, trouble swallowing and voice change  Eyes: Negative for pain, redness and visual disturbance  Respiratory: Positive for chest tightness (pressure)  Negative for cough and shortness of breath  Cardiovascular: Positive for chest pain (pressure)  Negative for palpitations, leg swelling and PND  Gastrointestinal: Negative for abdominal pain, constipation, diarrhea, nausea and vomiting  Genitourinary: Negative for dysuria, hematuria, vaginal bleeding and vaginal discharge  Musculoskeletal: Negative for back pain, gait problem and neck pain  Skin: Negative for color change and rash  Neurological: Negative for weakness and light-headedness  Psychiatric/Behavioral: Negative for agitation, confusion, decreased concentration, hallucinations, homicidal ideas and suicidal ideas  The patient is nervous/anxious  All other systems reviewed and are negative  Physical Exam  Physical Exam   Constitutional: She is oriented to person, place, and time  She appears well-developed and well-nourished  HENT:   Mouth/Throat: Oropharynx is clear and moist and mucous membranes are normal    Voice normal   Eyes: Pupils are equal, round, and reactive to light  EOM are normal    Cardiovascular: Normal rate and regular rhythm  Pulmonary/Chest: Effort normal    Abdominal: Soft  Bowel sounds are normal    Neurological: She is alert and oriented to person, place, and time  GCS eye subscore is 4  GCS verbal subscore is 5  GCS motor subscore is 6  Skin: Skin is warm and dry  Psychiatric: She has a normal mood and affect  Her speech is normal and behavior is normal  Judgment and thought content normal  She is attentive  Nursing note and vitals reviewed  Vital Signs  ED Triage Vitals [10/09/18 0308]   Temperature Pulse Respirations Blood Pressure SpO2   98 4 °F (36 9 °C) 70 18 115/76 98 %      Temp Source Heart Rate Source Patient Position - Orthostatic VS BP Location FiO2 (%)   Temporal Monitor Sitting Right arm --      Pain Score       1           Vitals:    10/09/18 0308 10/09/18 0745   BP: 115/76 118/72   Pulse: 70 67   Patient Position - Orthostatic VS: Sitting Lying       Visual Acuity      ED Medications  Medications - No data to display    Diagnostic Studies  Results Reviewed     Procedure Component Value Units Date/Time    Troponin I [[id number]]  (Normal) Collected:  10/09/18 0640    Lab Status:  Final result Specimen:  Blood from Arm, Right Updated:  10/09/18 0702     Troponin I <0 02 ng/mL     Troponin I [[id number]]  (Normal) Collected:  10/09/18 0458    Lab Status:  Final result Specimen:  Blood from Arm, Right Updated:  10/09/18 0527     Troponin I <0 02 ng/mL     Basic metabolic panel [[id number]] Collected:  10/09/18 0458    Lab Status:  Final result Specimen:  Blood from Arm, Right Updated:  10/09/18 0519     Sodium 137 mmol/L      Potassium 4 1 mmol/L      Chloride 105 mmol/L      CO2 26 mmol/L      ANION GAP 6 mmol/L      BUN 11 mg/dL      Creatinine 0 62 mg/dL      Glucose 90 mg/dL      Calcium 9 1 mg/dL      eGFR 121 ml/min/1 73sq m     Narrative:         National Kidney Disease Education Program recommendations are as follows:  GFR calculation is accurate only with a steady state creatinine  Chronic Kidney disease less than 60 ml/min/1 73 sq  meters  Kidney failure less than 15 ml/min/1 73 sq  meters      Magnesium [[id number]]  (Normal) Collected:  10/09/18 0458    Lab Status:  Final result Specimen:  Blood from Arm, Right Updated:  10/09/18 0519     Magnesium 1 8 mg/dL     D-Dimer [[id number]]  (Normal) Collected:  10/09/18 0458    Lab Status:  Final result Specimen:  Blood from Arm, Right Updated:  10/09/18 0519     D-Dimer, Quant <270 ng/ml (FEU)     Urine Microscopic [[id number]]  (Abnormal) Collected:  10/09/18 0459    Lab Status:  Final result Specimen:  Urine from Urine, Clean Catch Updated:  10/09/18 0519     RBC, UA None Seen /hpf      WBC, UA 1-2 (A) /hpf      Epithelial Cells Occasional /hpf      Bacteria, UA Occasional /hpf     UA w Reflex to Microscopic w Reflex to Culture [[id number]]  (Abnormal) Collected:  10/09/18 0459    Lab Status:  Final result Specimen:  Urine from Urine, Clean Catch Updated:  10/09/18 0508     Color, UA Yellow     Clarity, UA Slightly Cloudy     Specific Gravity, UA 1 015     pH, UA 6 5     Leukocytes, UA Trace (A)     Nitrite, UA Negative     Protein, UA Negative mg/dl      Glucose, UA Negative mg/dl      Ketones, UA Negative mg/dl      Urobilinogen, UA 0 2 E U /dl      Bilirubin, UA Negative     Blood, UA Negative    CBC and differential [[id number]] Collected:  10/09/18 0458    Lab Status:  Final result Specimen:  Blood from Arm, Right Updated:  10/09/18 0508     WBC 4 84 Thousand/uL      RBC 4 26 Million/uL      Hemoglobin 13 2 g/dL      Hematocrit 36 8 %      MCV 86 fL      MCH 31 0 pg      MCHC 35 9 g/dL      RDW 11 9 %      MPV 9 0 fL      Platelets 446 Thousands/uL      nRBC 0 /100 WBCs      Neutrophils Relative 52 %      Immat GRANS % 0 %      Lymphocytes Relative 36 %      Monocytes Relative 10 %      Eosinophils Relative 2 %      Basophils Relative 0 %      Neutrophils Absolute 2 52 Thousands/µL      Immature Grans Absolute 0 02 Thousand/uL      Lymphocytes Absolute 1 72 Thousands/µL      Monocytes Absolute 0 47 Thousand/µL      Eosinophils Absolute 0 10 Thousand/µL      Basophils Absolute 0 01 Thousands/µL     POCT pregnancy, urine [[id number]]  (Normal) Resulted:  10/09/18 0500    Lab Status:  Final result Updated:  10/09/18 0501 EXT PREG TEST UR (Ref: Negative) negative                 XR chest 2 views   Final Result by GENNA Mcgregor MD (10/09 0825)      No acute cardiopulmonary disease  Workstation performed: [id number]                    Procedures  Procedures       Phone Contacts  ED Phone Contact    ED Course         HEART Risk Score      Most Recent Value   History  0 Filed at: 10/09/2018 0542   ECG  0 Filed at: 10/09/2018 0542   Age  0 Filed at: 10/09/2018 0542   Risk Factors  1 Filed at: 10/09/2018 0542   Troponin  0 Filed at: 10/09/2018 0542   Heart Score Risk Calculator   History  0 Filed at: 10/09/2018 0542   ECG  0 Filed at: 10/09/2018 0542   Age  0 Filed at: 10/09/2018 0542   Risk Factors  1 Filed at: 10/09/2018 0542   Troponin  0 Filed at: 10/09/2018 0542   HEART Score  1 Filed at: 10/09/2018 0542   HEART Score  1 Filed at: 10/09/2018 0542                            [facility]  Number of Diagnoses or Management Options     Amount and/or Complexity of Data Reviewed  Tests in the radiology section of CPT®: ordered and reviewed  Tests in the medicine section of CPT®: ordered and reviewed  Decide to obtain previous medical records or to obtain history from someone other than the patient: yes  Discuss the patient with other providers: yes  Independent visualization of images, tracings, or specimens: yes    Patient Progress  Patient progress: stable    CritCare Time    Disposition  Final diagnoses:   Chest pressure     Time reflects when diagnosis was documented in both MDM as applicable and the Disposition within this note     Time User Action Codes Description Comment    10/9/2018  7:34 AM Jovita Sorto Add [R07 89] Chest pressure       ED Disposition     ED Disposition Condition Comment    Discharge  [facility] discharge to home/self care      Condition at discharge: Good        Follow-up Information     Follow up With Specialties Details Why Contact Lydia Hare, DO Family Medicine  Say you are following up from an ER visit  [address]  [phone]            Discharge Medication List as of 10/9/2018  7:35 AM      CONTINUE these medications which have NOT CHANGED    Details   citalopram (CELEXA) 10 mg tablet Take 10 mg by mouth daily, Historical Med      ketorolac (TORADOL) 10 mg tablet Take 1 tablet by mouth every 6 (six) hours as needed (pain) for up to 5 days, Starting Tue 6/13/2017, Until Sun 6/18/2017, Print           No discharge procedures on file      ED Provider  Electronically Signed by           Anton Samuels MD  10/29/18 8404

## 2018-10-10 LAB
ATRIAL RATE: 67 BPM
P AXIS: 69 DEGREES
PR INTERVAL: 230 MS
QRS AXIS: 101 DEGREES
QRSD INTERVAL: 80 MS
QT INTERVAL: 382 MS
QTC INTERVAL: 403 MS
T WAVE AXIS: 60 DEGREES
VENTRICULAR RATE: 67 BPM

## 2018-10-10 PROCEDURE — 93010 ELECTROCARDIOGRAM REPORT: CPT | Performed by: INTERNAL MEDICINE

## 2019-06-18 ENCOUNTER — OFFICE VISIT (OUTPATIENT)
Dept: URGENT CARE | Facility: MEDICAL CENTER | Age: 34
End: 2019-06-18
Payer: COMMERCIAL

## 2019-06-18 VITALS
WEIGHT: 140 LBS | HEART RATE: 74 BPM | SYSTOLIC BLOOD PRESSURE: 102 MMHG | HEIGHT: 64 IN | RESPIRATION RATE: 16 BRPM | OXYGEN SATURATION: 99 % | TEMPERATURE: 97.9 F | BODY MASS INDEX: 23.9 KG/M2 | DIASTOLIC BLOOD PRESSURE: 64 MMHG

## 2019-06-18 DIAGNOSIS — B96.89 BACTERIAL SINUSITIS: Primary | ICD-10-CM

## 2019-06-18 DIAGNOSIS — J32.9 BACTERIAL SINUSITIS: Primary | ICD-10-CM

## 2019-06-18 PROCEDURE — S9088 SERVICES PROVIDED IN URGENT: HCPCS | Performed by: FAMILY MEDICINE

## 2019-06-18 PROCEDURE — 99214 OFFICE O/P EST MOD 30 MIN: CPT | Performed by: FAMILY MEDICINE

## 2019-06-18 RX ORDER — CITALOPRAM 20 MG/1
20 TABLET ORAL DAILY
COMMUNITY
Start: 2019-04-25

## 2019-06-18 RX ORDER — AMOXICILLIN AND CLAVULANATE POTASSIUM 875; 125 MG/1; MG/1
1 TABLET, FILM COATED ORAL EVERY 12 HOURS SCHEDULED
Qty: 14 TABLET | Refills: 0 | Status: SHIPPED | OUTPATIENT
Start: 2019-06-18 | End: 2019-06-25

## 2019-06-18 RX ORDER — AMILORIDE HCL 5 MG
10 TABLET ORAL EVERY 4 HOURS PRN
COMMUNITY

## 2019-06-18 RX ORDER — METHYLPREDNISOLONE 4 MG/1
TABLET ORAL
Qty: 1 EACH | Refills: 0 | Status: SHIPPED | OUTPATIENT
Start: 2019-06-18 | End: 2020-04-13

## 2019-06-18 RX ORDER — CLONAZEPAM 0.5 MG/1
TABLET ORAL
COMMUNITY
Start: 2018-10-17 | End: 2020-04-13

## 2019-07-29 ENCOUNTER — OFFICE VISIT (OUTPATIENT)
Dept: URGENT CARE | Facility: CLINIC | Age: 34
End: 2019-07-29
Payer: COMMERCIAL

## 2019-07-29 VITALS
HEIGHT: 64 IN | BODY MASS INDEX: 23.9 KG/M2 | SYSTOLIC BLOOD PRESSURE: 104 MMHG | TEMPERATURE: 98.4 F | OXYGEN SATURATION: 99 % | HEART RATE: 80 BPM | DIASTOLIC BLOOD PRESSURE: 60 MMHG | RESPIRATION RATE: 20 BRPM | WEIGHT: 140 LBS

## 2019-07-29 DIAGNOSIS — S61.213A LACERATION OF LEFT MIDDLE FINGER WITHOUT FOREIGN BODY WITHOUT DAMAGE TO NAIL, INITIAL ENCOUNTER: ICD-10-CM

## 2019-07-29 DIAGNOSIS — S61.211A LACERATION OF LEFT INDEX FINGER WITHOUT FOREIGN BODY WITHOUT DAMAGE TO NAIL, INITIAL ENCOUNTER: Primary | ICD-10-CM

## 2019-07-29 PROCEDURE — S9088 SERVICES PROVIDED IN URGENT: HCPCS | Performed by: PHYSICIAN ASSISTANT

## 2019-07-29 PROCEDURE — 99213 OFFICE O/P EST LOW 20 MIN: CPT | Performed by: PHYSICIAN ASSISTANT

## 2019-07-29 PROCEDURE — 90471 IMMUNIZATION ADMIN: CPT | Performed by: PHYSICIAN ASSISTANT

## 2019-07-29 PROCEDURE — 90715 TDAP VACCINE 7 YRS/> IM: CPT

## 2019-07-29 RX ORDER — CETIRIZINE HYDROCHLORIDE 10 MG/1
10 TABLET ORAL DAILY
COMMUNITY

## 2019-07-29 NOTE — PATIENT INSTRUCTIONS
Acute Wound Care   AMBULATORY CARE:   An acute wound  is an injury that causes a break in the skin  An acute wound can happen suddenly, last a short time, and may heal on its own  Common signs and symptoms of an acute wound:   · A cut, tear, or gash in your skin    · Bleeding, swelling, pain, or trouble moving the affected area    · Dirt or foreign objects inside the wound     · Milky, yellow, green, or brown pus in the wound     · Red, tender, or warm area around the pus    · Fever  Seek care immediately if:   · You have pus or a foul odor coming from the wound  · You have sudden trouble breathing or chest pain  · Blood soaks through your bandage  Contact your healthcare provider if:   · You have muscle, joint, or body aches, sweating, or a fever  · You have more swelling, redness, or bleeding in your wound  · Your skin is itchy, swollen, or you have a rash  · You have questions or concerns about your condition or care  Treatment for an acute wound  may include any of the following:  · Cleansing  is done with soap and water to wash away germs and decrease the risk of infection  Sterile water further cleans the wound  The cleaning is done under high pressure with a catheter tip and large syringe  A solution that kills germs may also be used  · Debridement  is done to clean and remove objects, dirt, or dead tissues from the open wound  Healthcare providers may also drain the wound to clean out pus  · Closure of the wound  is done with stitches, staples, skin adhesive, or other treatments  This may be done if the wound is wide or deep  Stitches may be needed if the wound is in an area that moves a lot, such as the hands, feet, and joints  Stitches may help to keep the wound from getting infected  They may also decrease the amount of scarring you have  Some wounds may heal better without stitches    Wound care:   · If your wound was closed with thin strips of medical tape, keep them clean and dry  The strips of medical tape will fall off on their own  Do not pull them off  · Keep the bandage clean and dry  Do not remove the bandage over your wound unless your healthcare provider says it is okay  · Wash your hands before and after you take care of your wound to prevent infection  · Clean the wound as directed  If you cannot reach the wound, have someone help you  · If you have packing, make sure all the gauze used to pack the wound is taken out and replaced as directed  Keep track of how many gauze dressings are placed inside the wound  Follow up with your healthcare provider as directed:  Write down your questions so you remember to ask them during your visits  © 2016 [employer] is for End User's use only and may not be sold, redistributed or otherwise used for commercial purposes  All illustrations and images included in CareNotes® are the copyrighted property of A D A M [employer]  or [employer]  The above information is an  only  It is not intended as medical advice for individual conditions or treatments  Talk to your doctor, nurse or pharmacist before following any medical regimen to see if it is safe and effective for you

## 2019-07-29 NOTE — PROGRESS NOTES
[facility] [facility]        NAME: Denisha Muñiz is a 30 y o  female  : 1985    MRN: [id number]  DATE: 2019  TIME: 12:54 PM    Assessment and Plan   Laceration of left index finger without foreign body without damage to nail, initial encounter [S61 211A]  1  Laceration of left index finger without foreign body without damage to nail, initial encounter  TDAP Vaccine greater than or equal to 8yo   2  Laceration of left middle finger without foreign body without damage to nail, initial encounter  TDAP Vaccine greater than or equal to 8yo         Patient Instructions       Follow up with PCP in 3-5 days  Proceed to  ER if symptoms worsen  Chief Complaint     Chief Complaint   Patient presents with    Laceration     left index and middle finger lac 2 days ago  needs tetanus         History of Present Illness       Patient sustained lacerations to her left index and middle fingers 2 days ago when she was running her hand across the outlet cover and was a bur on the screw which fixed the outlet cover  She states the wounds bled  Wound care was provided by patient  She contacted her family doctor today to see if she was due for tetanus they were unable to do the booster today and therefore sent here  Review of Systems   Review of Systems   Constitutional: Negative for chills and fever  HENT: Positive for sore throat  Cardiovascular: Negative for chest pain  Musculoskeletal: Negative for arthralgias and myalgias  Skin: Positive for wound  Neurological: Negative for weakness and numbness  Hematological: Negative for adenopathy           Current Medications       Current Outpatient Medications:     cetirizine (ZyrTEC) 10 mg tablet, Take 10 mg by mouth daily, Disp: , Rfl:     citalopram (CeleXA) 20 mg tablet, Take 20 mg by mouth daily, Disp: , Rfl:     clonazePAM (KlonoPIN) 0 5 mg tablet, take 1 tablet by mouth twice a day for anxiety, Disp: , Rfl:     Cromolyn Sodium (NASAL ALLERGY NA), into each nostril, Disp: , Rfl:     methylPREDNISolone 4 MG tablet therapy pack, Use as directed on package (Patient not taking: Reported on 7/29/2019), Disp: 1 each, Rfl: 0    phenylephrine (SUDAFED PE) 10 MG TABS, Take 10 mg by mouth every 4 (four) hours as needed for congestion, Disp: , Rfl:     Current Allergies     Allergies as of 07/29/2019 - Reviewed 07/29/2019   Allergen Reaction Noted    Thorazine [chlorpromazine] Other (See Comments) 06/13/2017            The following portions of the patient's history were reviewed and updated as appropriate: allergies, current medications, past family history, past medical history, past social history, past surgical history and problem list      Past Medical History:   Diagnosis Date    Anxiety     Environmental and seasonal allergies     Migraine     Palpitations        Past Surgical History:   Procedure Laterality Date    BREAST SURGERY      WISDOM TOOTH EXTRACTION         History reviewed  No pertinent family history  Medications have been verified  Objective   /60   Pulse 80   Temp 98 4 °F (36 9 °C) (Tympanic)   Resp 20   Ht 5' 4" (1 626 m)   Wt 63 5 kg (140 lb)   SpO2 99%   BMI 24 03 kg/m²        Physical Exam     Physical Exam   Constitutional: She is oriented to person, place, and time  She appears well-developed and well-nourished  HENT:   Head: Normocephalic and atraumatic  Neck: Normal range of motion  Cardiovascular: Normal rate and regular rhythm  Pulmonary/Chest: Effort normal    Neurological: She is alert and oriented to person, place, and time  Skin: Skin is warm and dry  Healing superficial lacerations across the volar aspect of the left index and middle fingers  No surrounding erythema no drainage no lymphatic streaking capillary refill less than 2 seconds  Psychiatric: She has a normal mood and affect  Her behavior is normal    Nursing note and vitals reviewed

## 2020-04-13 ENCOUNTER — APPOINTMENT (EMERGENCY)
Dept: CT IMAGING | Facility: HOSPITAL | Age: 35
End: 2020-04-13
Payer: COMMERCIAL

## 2020-04-13 ENCOUNTER — HOSPITAL ENCOUNTER (EMERGENCY)
Facility: HOSPITAL | Age: 35
Discharge: HOME | End: 2020-04-13
Attending: EMERGENCY MEDICINE
Payer: COMMERCIAL

## 2020-04-13 VITALS
OXYGEN SATURATION: 100 % | SYSTOLIC BLOOD PRESSURE: 114 MMHG | HEART RATE: 88 BPM | WEIGHT: 138.67 LBS | TEMPERATURE: 97.7 F | RESPIRATION RATE: 17 BRPM | BODY MASS INDEX: 23.8 KG/M2 | DIASTOLIC BLOOD PRESSURE: 75 MMHG

## 2020-04-13 DIAGNOSIS — G43.909 MIGRAINE HEADACHE: Primary | ICD-10-CM

## 2020-04-13 LAB
ALBUMIN SERPL BCP-MCNC: 4.2 G/DL (ref 3.5–5)
ALP SERPL-CCNC: 52 U/L (ref 46–116)
ALT SERPL W P-5'-P-CCNC: 18 U/L (ref 12–78)
ANION GAP SERPL CALCULATED.3IONS-SCNC: 8 MMOL/L (ref 4–13)
AST SERPL W P-5'-P-CCNC: 16 U/L (ref 5–45)
BASOPHILS # BLD AUTO: 0.03 THOUSANDS/ΜL (ref 0–0.1)
BASOPHILS NFR BLD AUTO: 1 % (ref 0–1)
BILIRUB SERPL-MCNC: 0.8 MG/DL (ref 0.2–1)
BUN SERPL-MCNC: 11 MG/DL (ref 5–25)
CALCIUM SERPL-MCNC: 9.3 MG/DL (ref 8.3–10.1)
CHLORIDE SERPL-SCNC: 102 MMOL/L (ref 100–108)
CO2 SERPL-SCNC: 29 MMOL/L (ref 21–32)
CREAT SERPL-MCNC: 0.73 MG/DL (ref 0.6–1.3)
EOSINOPHIL # BLD AUTO: 0.1 THOUSAND/ΜL (ref 0–0.61)
EOSINOPHIL NFR BLD AUTO: 2 % (ref 0–6)
ERYTHROCYTE [DISTWIDTH] IN BLOOD BY AUTOMATED COUNT: 12.1 % (ref 11.6–15.1)
EXT PREG TEST URINE: NEGATIVE
EXT. CONTROL ED NAV: NORMAL
GFR SERPL CREATININE-BSD FRML MDRD: 109 ML/MIN/1.73SQ M
GLUCOSE SERPL-MCNC: 103 MG/DL (ref 65–140)
HCT VFR BLD AUTO: 41.1 % (ref 34.8–46.1)
HGB BLD-MCNC: 14.3 G/DL (ref 11.5–15.4)
IMM GRANULOCYTES # BLD AUTO: 0.01 THOUSAND/UL (ref 0–0.2)
IMM GRANULOCYTES NFR BLD AUTO: 0 % (ref 0–2)
LYMPHOCYTES # BLD AUTO: 2.28 THOUSANDS/ΜL (ref 0.6–4.47)
LYMPHOCYTES NFR BLD AUTO: 42 % (ref 14–44)
MCH RBC QN AUTO: 31 PG (ref 26.8–34.3)
MCHC RBC AUTO-ENTMCNC: 34.8 G/DL (ref 31.4–37.4)
MCV RBC AUTO: 89 FL (ref 82–98)
MONOCYTES # BLD AUTO: 0.54 THOUSAND/ΜL (ref 0.17–1.22)
MONOCYTES NFR BLD AUTO: 10 % (ref 4–12)
NEUTROPHILS # BLD AUTO: 2.53 THOUSANDS/ΜL (ref 1.85–7.62)
NEUTS SEG NFR BLD AUTO: 45 % (ref 43–75)
NRBC BLD AUTO-RTO: 0 /100 WBCS
PLATELET # BLD AUTO: 248 THOUSANDS/UL (ref 149–390)
PMV BLD AUTO: 8.8 FL (ref 8.9–12.7)
POTASSIUM SERPL-SCNC: 3.3 MMOL/L (ref 3.5–5.3)
PROT SERPL-MCNC: 7.2 G/DL (ref 6.4–8.2)
RBC # BLD AUTO: 4.62 MILLION/UL (ref 3.81–5.12)
SODIUM SERPL-SCNC: 139 MMOL/L (ref 136–145)
TROPONIN I SERPL-MCNC: <0.02 NG/ML
WBC # BLD AUTO: 5.49 THOUSAND/UL (ref 4.31–10.16)

## 2020-04-13 PROCEDURE — 84484 ASSAY OF TROPONIN QUANT: CPT | Performed by: EMERGENCY MEDICINE

## 2020-04-13 PROCEDURE — 70496 CT ANGIOGRAPHY HEAD: CPT

## 2020-04-13 PROCEDURE — 99284 EMERGENCY DEPT VISIT MOD MDM: CPT | Performed by: EMERGENCY MEDICINE

## 2020-04-13 PROCEDURE — 36415 COLL VENOUS BLD VENIPUNCTURE: CPT | Performed by: EMERGENCY MEDICINE

## 2020-04-13 PROCEDURE — 85025 COMPLETE CBC W/AUTO DIFF WBC: CPT | Performed by: EMERGENCY MEDICINE

## 2020-04-13 PROCEDURE — 96375 TX/PRO/DX INJ NEW DRUG ADDON: CPT

## 2020-04-13 PROCEDURE — 96365 THER/PROPH/DIAG IV INF INIT: CPT

## 2020-04-13 PROCEDURE — 81025 URINE PREGNANCY TEST: CPT | Performed by: EMERGENCY MEDICINE

## 2020-04-13 PROCEDURE — 93005 ELECTROCARDIOGRAM TRACING: CPT

## 2020-04-13 PROCEDURE — 70498 CT ANGIOGRAPHY NECK: CPT

## 2020-04-13 PROCEDURE — 99284 EMERGENCY DEPT VISIT MOD MDM: CPT

## 2020-04-13 PROCEDURE — 80053 COMPREHEN METABOLIC PANEL: CPT | Performed by: EMERGENCY MEDICINE

## 2020-04-13 RX ORDER — MAGNESIUM SULFATE 1 G/100ML
1 INJECTION INTRAVENOUS ONCE
Status: COMPLETED | OUTPATIENT
Start: 2020-04-13 | End: 2020-04-13

## 2020-04-13 RX ORDER — POTASSIUM CHLORIDE 20 MEQ/1
20 TABLET, EXTENDED RELEASE ORAL ONCE
Status: COMPLETED | OUTPATIENT
Start: 2020-04-13 | End: 2020-04-13

## 2020-04-13 RX ORDER — ACETAMINOPHEN AND CODEINE PHOSPHATE 300; 30 MG/1; MG/1
1 TABLET ORAL AS NEEDED
COMMUNITY
Start: 2007-09-13

## 2020-04-13 RX ORDER — METOCLOPRAMIDE HYDROCHLORIDE 5 MG/ML
5 INJECTION INTRAMUSCULAR; INTRAVENOUS ONCE
Status: COMPLETED | OUTPATIENT
Start: 2020-04-13 | End: 2020-04-13

## 2020-04-13 RX ORDER — DIPHENHYDRAMINE HYDROCHLORIDE 50 MG/ML
25 INJECTION INTRAMUSCULAR; INTRAVENOUS ONCE
Status: COMPLETED | OUTPATIENT
Start: 2020-04-13 | End: 2020-04-13

## 2020-04-13 RX ADMIN — DIPHENHYDRAMINE HYDROCHLORIDE 25 MG: 50 INJECTION INTRAMUSCULAR; INTRAVENOUS at 18:01

## 2020-04-13 RX ADMIN — SODIUM CHLORIDE 1000 ML: 0.9 INJECTION, SOLUTION INTRAVENOUS at 17:57

## 2020-04-13 RX ADMIN — METOCLOPRAMIDE HYDROCHLORIDE 5 MG: 5 INJECTION INTRAMUSCULAR; INTRAVENOUS at 18:01

## 2020-04-13 RX ADMIN — IOHEXOL 100 ML: 350 INJECTION, SOLUTION INTRAVENOUS at 18:23

## 2020-04-13 RX ADMIN — POTASSIUM CHLORIDE 20 MEQ: 1500 TABLET, EXTENDED RELEASE ORAL at 18:31

## 2020-04-13 RX ADMIN — MAGNESIUM SULFATE HEPTAHYDRATE 1 G: 1 INJECTION, SOLUTION INTRAVENOUS at 18:04

## 2020-04-14 ENCOUNTER — PATIENT OUTREACH (OUTPATIENT)
Dept: CASE MANAGEMENT | Facility: OTHER | Age: 35
End: 2020-04-14

## 2020-04-14 LAB
ATRIAL RATE: 80 BPM
P AXIS: 74 DEGREES
PR INTERVAL: 204 MS
QRS AXIS: 93 DEGREES
QRSD INTERVAL: 90 MS
QT INTERVAL: 392 MS
QTC INTERVAL: 452 MS
T WAVE AXIS: 48 DEGREES
VENTRICULAR RATE: 80 BPM

## 2020-04-14 PROCEDURE — 93010 ELECTROCARDIOGRAM REPORT: CPT | Performed by: INTERNAL MEDICINE

## 2020-07-06 ENCOUNTER — HOSPITAL ENCOUNTER (EMERGENCY)
Facility: HOSPITAL | Age: 35
Discharge: HOME | End: 2020-07-06
Attending: EMERGENCY MEDICINE | Admitting: EMERGENCY MEDICINE
Payer: COMMERCIAL

## 2020-07-06 VITALS
DIASTOLIC BLOOD PRESSURE: 67 MMHG | HEART RATE: 82 BPM | TEMPERATURE: 98.7 F | BODY MASS INDEX: 22.15 KG/M2 | SYSTOLIC BLOOD PRESSURE: 122 MMHG | HEIGHT: 63 IN | WEIGHT: 125 LBS | RESPIRATION RATE: 18 BRPM | OXYGEN SATURATION: 97 %

## 2020-07-06 DIAGNOSIS — G43.909 MIGRAINE HEADACHE: Primary | ICD-10-CM

## 2020-07-06 LAB
ANION GAP SERPL CALCULATED.3IONS-SCNC: 4 MMOL/L (ref 4–13)
BASOPHILS # BLD AUTO: 0.03 THOUSANDS/ΜL (ref 0–0.1)
BASOPHILS NFR BLD AUTO: 1 % (ref 0–1)
BUN SERPL-MCNC: 12 MG/DL (ref 5–25)
CALCIUM SERPL-MCNC: 8.9 MG/DL (ref 8.3–10.1)
CHLORIDE SERPL-SCNC: 104 MMOL/L (ref 100–108)
CO2 SERPL-SCNC: 30 MMOL/L (ref 21–32)
CREAT SERPL-MCNC: 0.9 MG/DL (ref 0.6–1.3)
EOSINOPHIL # BLD AUTO: 0.07 THOUSAND/ΜL (ref 0–0.61)
EOSINOPHIL NFR BLD AUTO: 1 % (ref 0–6)
ERYTHROCYTE [DISTWIDTH] IN BLOOD BY AUTOMATED COUNT: 11.9 % (ref 11.6–15.1)
EXT PREG TEST URINE: NEGATIVE
EXT. CONTROL ED NAV: NORMAL
GFR SERPL CREATININE-BSD FRML MDRD: 84 ML/MIN/1.73SQ M
GLUCOSE SERPL-MCNC: 99 MG/DL (ref 65–140)
HCT VFR BLD AUTO: 42.4 % (ref 34.8–46.1)
HGB BLD-MCNC: 14.4 G/DL (ref 11.5–15.4)
IMM GRANULOCYTES # BLD AUTO: 0.01 THOUSAND/UL (ref 0–0.2)
IMM GRANULOCYTES NFR BLD AUTO: 0 % (ref 0–2)
LYMPHOCYTES # BLD AUTO: 1.65 THOUSANDS/ΜL (ref 0.6–4.47)
LYMPHOCYTES NFR BLD AUTO: 30 % (ref 14–44)
MCH RBC QN AUTO: 30.8 PG (ref 26.8–34.3)
MCHC RBC AUTO-ENTMCNC: 34 G/DL (ref 31.4–37.4)
MCV RBC AUTO: 91 FL (ref 82–98)
MONOCYTES # BLD AUTO: 0.47 THOUSAND/ΜL (ref 0.17–1.22)
MONOCYTES NFR BLD AUTO: 9 % (ref 4–12)
NEUTROPHILS # BLD AUTO: 3.28 THOUSANDS/ΜL (ref 1.85–7.62)
NEUTS SEG NFR BLD AUTO: 59 % (ref 43–75)
NRBC BLD AUTO-RTO: 0 /100 WBCS
PLATELET # BLD AUTO: 269 THOUSANDS/UL (ref 149–390)
PMV BLD AUTO: 8.6 FL (ref 8.9–12.7)
POTASSIUM SERPL-SCNC: 3.7 MMOL/L (ref 3.5–5.3)
RBC # BLD AUTO: 4.67 MILLION/UL (ref 3.81–5.12)
SODIUM SERPL-SCNC: 138 MMOL/L (ref 136–145)
WBC # BLD AUTO: 5.51 THOUSAND/UL (ref 4.31–10.16)

## 2020-07-06 PROCEDURE — 99283 EMERGENCY DEPT VISIT LOW MDM: CPT

## 2020-07-06 PROCEDURE — 36415 COLL VENOUS BLD VENIPUNCTURE: CPT | Performed by: EMERGENCY MEDICINE

## 2020-07-06 PROCEDURE — 80048 BASIC METABOLIC PNL TOTAL CA: CPT | Performed by: EMERGENCY MEDICINE

## 2020-07-06 PROCEDURE — 99284 EMERGENCY DEPT VISIT MOD MDM: CPT | Performed by: EMERGENCY MEDICINE

## 2020-07-06 PROCEDURE — 81025 URINE PREGNANCY TEST: CPT | Performed by: EMERGENCY MEDICINE

## 2020-07-06 PROCEDURE — 85025 COMPLETE CBC W/AUTO DIFF WBC: CPT | Performed by: EMERGENCY MEDICINE

## 2020-07-06 PROCEDURE — 96365 THER/PROPH/DIAG IV INF INIT: CPT

## 2020-07-06 PROCEDURE — 96375 TX/PRO/DX INJ NEW DRUG ADDON: CPT

## 2020-07-06 RX ORDER — KETOROLAC TROMETHAMINE 30 MG/ML
15 INJECTION, SOLUTION INTRAMUSCULAR; INTRAVENOUS ONCE
Status: COMPLETED | OUTPATIENT
Start: 2020-07-06 | End: 2020-07-06

## 2020-07-06 RX ORDER — DIPHENHYDRAMINE HYDROCHLORIDE 50 MG/ML
25 INJECTION INTRAMUSCULAR; INTRAVENOUS ONCE
Status: COMPLETED | OUTPATIENT
Start: 2020-07-06 | End: 2020-07-06

## 2020-07-06 RX ORDER — METOCLOPRAMIDE HYDROCHLORIDE 5 MG/ML
5 INJECTION INTRAMUSCULAR; INTRAVENOUS ONCE
Status: COMPLETED | OUTPATIENT
Start: 2020-07-06 | End: 2020-07-06

## 2020-07-06 RX ORDER — MAGNESIUM SULFATE 1 G/100ML
1 INJECTION INTRAVENOUS ONCE
Status: COMPLETED | OUTPATIENT
Start: 2020-07-06 | End: 2020-07-06

## 2020-07-06 RX ADMIN — KETOROLAC TROMETHAMINE 15 MG: 30 INJECTION, SOLUTION INTRAMUSCULAR at 15:57

## 2020-07-06 RX ADMIN — METOCLOPRAMIDE HYDROCHLORIDE 5 MG: 5 INJECTION INTRAMUSCULAR; INTRAVENOUS at 15:56

## 2020-07-06 RX ADMIN — SODIUM CHLORIDE 1000 ML: 0.9 INJECTION, SOLUTION INTRAVENOUS at 15:57

## 2020-07-06 RX ADMIN — DIPHENHYDRAMINE HYDROCHLORIDE 25 MG: 50 INJECTION INTRAMUSCULAR; INTRAVENOUS at 15:56

## 2020-07-06 RX ADMIN — MAGNESIUM SULFATE HEPTAHYDRATE 1 G: 1 INJECTION, SOLUTION INTRAVENOUS at 15:57

## 2020-07-06 RX ADMIN — SODIUM CHLORIDE 500 ML: 0.9 INJECTION, SOLUTION INTRAVENOUS at 16:50

## 2020-07-06 NOTE — ED PROVIDER NOTES
History  Chief Complaint   Patient presents with    Headache     patient reports headache started at noon, states it is on left side middle forehead and radiates to left side   pt states she is sensitive to light and sound    pt states has history of headaches     68-year-old female with a history migraine headaches presents complaining of frontal headache  Patient states this headache began approximately 12 noon  Patient refused CT scan as she recently had a CT of the head neck in April which was normal   She states this is classic for her migraine headaches  She denies chest pain shortness of breath nuchal rigidity or any other symptoms that are concerning  History provided by:  Patient  Migraine   Location:  Frontal region  Severity:  Moderate  Onset quality:  Gradual  Duration:  3 hours  Timing:  Constant  Progression:  Waxing and waning  Associated symptoms: headaches    Associated symptoms: no abdominal pain, no chest pain, no congestion, no cough, no diarrhea and no ear pain        Prior to Admission Medications   Prescriptions Last Dose Informant Patient Reported? Taking? Cromolyn Sodium (NASAL ALLERGY NA)   Yes Yes   Sig: into each nostril daily as needed    acetaminophen-codeine (TYLENOL/CODEINE #3) 300-30 MG per tablet   Yes Yes   Sig: Take 1 tablet by mouth as needed   cetirizine (ZyrTEC) 10 mg tablet   Yes Yes   Sig: Take 10 mg by mouth daily   citalopram (CeleXA) 20 mg tablet   Yes Yes   Sig: Take 20 mg by mouth daily   phenylephrine (SUDAFED PE) 10 MG TABS   Yes Yes   Sig: Take 10 mg by mouth every 4 (four) hours as needed for congestion      Facility-Administered Medications: None       Past Medical History:   Diagnosis Date    Anxiety     Environmental and seasonal allergies     Migraine     Palpitations        Past Surgical History:   Procedure Laterality Date    BREAST SURGERY      WISDOM TOOTH EXTRACTION         History reviewed  No pertinent family history    I have reviewed and agree with the history as documented  E-Cigarette/Vaping    E-Cigarette Use Never User      E-Cigarette/Vaping Substances     Social History     Tobacco Use    Smoking status: Never Smoker    Smokeless tobacco: Never Used   Substance Use Topics    Alcohol use: Yes     Frequency: Monthly or less     Comment: socially    Drug use: No       Review of Systems   HENT: Negative for congestion and ear pain  Acoustic phobia   Eyes:        Photophobia   Respiratory: Negative for cough  Cardiovascular: Negative for chest pain  Gastrointestinal: Negative for abdominal pain and diarrhea  Endocrine: Negative  Genitourinary: Negative  Musculoskeletal: Negative  Skin: Negative  Allergic/Immunologic: Negative  Neurological: Positive for headaches  Hematological: Negative  Psychiatric/Behavioral: Negative  All other systems reviewed and are negative  Physical Exam  Physical Exam   Constitutional: She appears well-developed and well-nourished  HENT:   Head: Normocephalic  Right Ear: External ear normal    Left Ear: External ear normal    Eyes: Pupils are equal, round, and reactive to light  Right eye exhibits no discharge  Left eye exhibits no discharge  Neck: Normal range of motion and full passive range of motion without pain  Neck supple  No spinous process tenderness present  Normal range of motion present  No Brudzinski's sign and no Kernig's sign noted  Cardiovascular: Normal rate and regular rhythm  Pulmonary/Chest: Effort normal    Abdominal: Soft  Musculoskeletal: Normal range of motion  She exhibits no edema  Neurological: She is alert  She displays normal reflexes  No cranial nerve deficit  She exhibits normal muscle tone  Coordination normal    Skin: Skin is warm  Capillary refill takes less than 2 seconds  Psychiatric: She has a normal mood and affect  Her behavior is normal  Thought content normal    Vitals reviewed        Vital Signs  ED Triage Vitals [07/06/20 1543]   Temperature Pulse Respirations Blood Pressure SpO2   98 7 °F (37 1 °C) 82 18 122/67 97 %      Temp Source Heart Rate Source Patient Position - Orthostatic VS BP Location FiO2 (%)   Temporal Monitor Sitting Left arm --      Pain Score       Worst Possible Pain           Vitals:    07/06/20 1543   BP: 122/67   Pulse: 82   Patient Position - Orthostatic VS: Sitting         Visual Acuity      ED Medications  Medications   sodium chloride 0 9 % bolus 1,000 mL (has no administration in time range)   metoclopramide (REGLAN) injection 5 mg (has no administration in time range)   diphenhydrAMINE (BENADRYL) injection 25 mg (has no administration in time range)   ketorolac (TORADOL) injection 15 mg (has no administration in time range)   magnesium sulfate IVPB (premix) SOLN 1 g (has no administration in time range)       Diagnostic Studies  Results Reviewed     Procedure Component Value Units Date/Time    CBC and differential [[id number]]  (Abnormal) Collected:  07/06/20 1550    Lab Status:  Final result Specimen:  Blood from Arm, Left Updated:  07/06/20 1557     WBC 5 51 Thousand/uL      RBC 4 67 Million/uL      Hemoglobin 14 4 g/dL      Hematocrit 42 4 %      MCV 91 fL      MCH 30 8 pg      MCHC 34 0 g/dL      RDW 11 9 %      MPV 8 6 fL      Platelets 408 Thousands/uL      nRBC 0 /100 WBCs      Neutrophils Relative 59 %      Immat GRANS % 0 %      Lymphocytes Relative 30 %      Monocytes Relative 9 %      Eosinophils Relative 1 %      Basophils Relative 1 %      Neutrophils Absolute 3 28 Thousands/µL      Immature Grans Absolute 0 01 Thousand/uL      Lymphocytes Absolute 1 65 Thousands/µL      Monocytes Absolute 0 47 Thousand/µL      Eosinophils Absolute 0 07 Thousand/µL      Basophils Absolute 0 03 Thousands/µL     Basic metabolic panel [[id number]] Collected:  07/06/20 1550    Lab Status:   In process Specimen:  Blood from Arm, Left Updated:  07/06/20 1554    POCT pregnancy, urine [[id number]]     Lab Status: No result                  No orders to display              Procedures  Procedures         ED Course       US AUDIT      Most Recent Value   Initial Alcohol Screen: US AUDIT-C    1  How often do you have a drink containing alcohol?  0 Filed at: 07/06/2020 1543   2  How many drinks containing alcohol do you have on a typical day you are drinking? 0 Filed at: 07/06/2020 1543   3a  Male UNDER 65: How often do you have five or more drinks on one occasion? 0 Filed at: 07/06/2020 1543   3b  FEMALE Any Age, or MALE 65+: How often do you have 4 or more drinks on one occassion? 0 Filed at: 07/06/2020 1543   Audit-C Score  0 Filed at: 07/06/2020 1543                  PATRICIA/DAST-10      Most Recent Value   How many times in the past year have you    Used an illegal drug or used a prescription medication for non-medical reasons? Never Filed at: 07/06/2020 1543                                MDM  Number of Diagnoses or Management Options  Diagnosis management comments: Patient refused CT scan head  Will proceed with basic lab evaluation and a migraine cocktail  Patient has no concerning symptoms for encephalitis, meningitis, subarachnoid hemorrhage (patient recently had an CTA of the head neck which showed all vessels were normal without any evidence of aneurysm  Disposition  Final diagnoses:   None     ED Disposition     None      Follow-up Information    None         Patient's Medications   Discharge Prescriptions    No medications on file     No discharge procedures on file      PDMP Review     None          ED Provider  Electronically Signed by           Tamara Rico DO  07/06/20 2036

## 2020-07-17 ENCOUNTER — OFFICE VISIT (OUTPATIENT)
Dept: URGENT CARE | Facility: CLINIC | Age: 35
End: 2020-07-17
Payer: COMMERCIAL

## 2020-07-17 VITALS
DIASTOLIC BLOOD PRESSURE: 68 MMHG | OXYGEN SATURATION: 98 % | RESPIRATION RATE: 18 BRPM | HEART RATE: 73 BPM | WEIGHT: 125 LBS | TEMPERATURE: 98.6 F | SYSTOLIC BLOOD PRESSURE: 112 MMHG | HEIGHT: 63 IN | BODY MASS INDEX: 22.15 KG/M2

## 2020-07-17 DIAGNOSIS — Z91.09 ALLERGY TO ENVIRONMENTAL FACTORS: Primary | ICD-10-CM

## 2020-07-17 PROCEDURE — S9088 SERVICES PROVIDED IN URGENT: HCPCS | Performed by: EMERGENCY MEDICINE

## 2020-07-17 PROCEDURE — 99212 OFFICE O/P EST SF 10 MIN: CPT | Performed by: EMERGENCY MEDICINE

## 2020-07-17 RX ORDER — FEXOFENADINE HCL 180 MG/1
180 TABLET ORAL DAILY
COMMUNITY

## 2020-07-17 NOTE — PROGRESS NOTES
[facility]        NAME: Denisha Muñiz is a 37 y o  female  : 1985    MRN: [id number]  DATE: 2020  TIME: 12:02 PM    Assessment and Plan   Allergy to environmental factors [Z91 09]  1  Allergy to environmental factors           Patient Instructions     Patient Instructions   1  Continue your Allegra as directed  2  Follow up with your family doctor if symptoms worsen  Follow up with PCP in 3-5 days  Proceed to  ER if symptoms worsen  Chief Complaint     Chief Complaint   Patient presents with    Earache     burning in both ears for 2 weeks         History of Present Illness       This is a 53-year-old female who presents with burning in her ears  She states that she does have seasonal allergies and has been using Zyrtec when it stopped working she started her on Allegra and that has abated her symptoms  She states that her ear started burning at that time she wanted to make sure that she does not have a sinus infection or ear infection  She has had no fever  She has had no exposure to COVID nor has she traveled  Review of Systems   Review of Systems   Constitutional: Negative for fever  HENT: Positive for ear pain  Negative for congestion, ear discharge, sinus pressure, sinus pain and sore throat  Eyes: Negative for pain, discharge and redness  Respiratory: Negative for cough and shortness of breath  Gastrointestinal: Positive for diarrhea  Patient states that she does have diarrhea but this is routine for her because she takes magnesium for her migraine headaches  Musculoskeletal: Negative for myalgias  Neurological: Positive for headaches  Patient does have chronic migraine headaches           Current Medications       Current Outpatient Medications:     citalopram (CeleXA) 20 mg tablet, Take 20 mg by mouth daily, Disp: , Rfl:     Cromolyn Sodium (NASAL ALLERGY NA), into each nostril daily as needed , Disp: , Rfl:     fexofenadine (ALLEGRA) 180 MG tablet, Take 180 mg by mouth daily, Disp: , Rfl:     phenylephrine (SUDAFED PE) 10 MG TABS, Take 10 mg by mouth every 4 (four) hours as needed for congestion, Disp: , Rfl:     acetaminophen-codeine (TYLENOL/CODEINE #3) 300-30 MG per tablet, Take 1 tablet by mouth as needed, Disp: , Rfl:     cetirizine (ZyrTEC) 10 mg tablet, Take 10 mg by mouth daily, Disp: , Rfl:     Current Allergies     Allergies as of 07/17/2020 - Reviewed 07/17/2020   Allergen Reaction Noted    Thorazine [chlorpromazine] Other (See Comments) 06/13/2017            The following portions of the patient's history were reviewed and updated as appropriate: allergies, current medications, past family history, past medical history, past social history, past surgical history and problem list      Past Medical History:   Diagnosis Date    Anxiety     Environmental and seasonal allergies     Migraine     Palpitations        Past Surgical History:   Procedure Laterality Date    BREAST SURGERY      WISDOM TOOTH EXTRACTION         History reviewed  No pertinent family history  Medications have been verified  Objective   /68   Pulse 73   Temp 98 6 °F (37 °C) (Tympanic)   Resp 18   Ht 5' 3" (1 6 m)   Wt 56 7 kg (125 lb)   SpO2 98%   BMI 22 14 kg/m²        Physical Exam     Physical Exam   Constitutional: She is oriented to person, place, and time  She appears well-developed and well-nourished  HENT:   Head: Normocephalic and atraumatic  Right Ear: External ear normal    Left Ear: External ear normal    Nose: Nose normal    Mouth/Throat: Oropharynx is clear and moist  No oropharyngeal exudate  TMs are clear bilaterally  Eyes: Pupils are equal, round, and reactive to light  Conjunctivae and EOM are normal  Right eye exhibits no discharge  Left eye exhibits no discharge  Neck: Normal range of motion  Neck supple  Cardiovascular: Normal rate, regular rhythm and normal heart sounds     No murmur heard   Pulmonary/Chest: Effort normal and breath sounds normal  She has no wheezes  She has no rales  Abdominal: Soft  Bowel sounds are normal  She exhibits no distension  There is no tenderness  Musculoskeletal: Normal range of motion  Lymphadenopathy:     She has no cervical adenopathy  Neurological: She is alert and oriented to person, place, and time  Skin: Skin is warm and dry  No rash noted  No erythema  Psychiatric: She has a normal mood and affect  Her behavior is normal    Nursing note and vitals reviewed

## 2022-02-24 ENCOUNTER — HOSPITAL ENCOUNTER (EMERGENCY)
Facility: HOSPITAL | Age: 37
Discharge: HOME | End: 2022-02-24
Attending: EMERGENCY MEDICINE | Admitting: EMERGENCY MEDICINE
Payer: COMMERCIAL

## 2022-02-24 ENCOUNTER — APPOINTMENT (EMERGENCY)
Dept: CT IMAGING | Facility: HOSPITAL | Age: 37
End: 2022-02-24
Payer: COMMERCIAL

## 2022-02-24 VITALS
TEMPERATURE: 97.6 F | HEART RATE: 87 BPM | BODY MASS INDEX: 22.15 KG/M2 | RESPIRATION RATE: 16 BRPM | WEIGHT: 125 LBS | HEIGHT: 63 IN | SYSTOLIC BLOOD PRESSURE: 105 MMHG | DIASTOLIC BLOOD PRESSURE: 53 MMHG | OXYGEN SATURATION: 97 %

## 2022-02-24 DIAGNOSIS — R10.9 ABDOMINAL PAIN: ICD-10-CM

## 2022-02-24 DIAGNOSIS — R11.0 NAUSEA: ICD-10-CM

## 2022-02-24 DIAGNOSIS — R42 VERTIGO: Primary | ICD-10-CM

## 2022-02-24 LAB
ALBUMIN SERPL BCP-MCNC: 4.5 G/DL (ref 3.5–5)
ALP SERPL-CCNC: 46 U/L (ref 34–104)
ALT SERPL W P-5'-P-CCNC: 19 U/L (ref 7–52)
ANION GAP SERPL CALCULATED.3IONS-SCNC: 9 MMOL/L (ref 4–13)
APTT PPP: 28 SECONDS (ref 23–37)
AST SERPL W P-5'-P-CCNC: 16 U/L (ref 13–39)
ATRIAL RATE: 91 BPM
BASOPHILS # BLD AUTO: 0.02 THOUSANDS/ΜL (ref 0–0.1)
BASOPHILS NFR BLD AUTO: 0 % (ref 0–1)
BILIRUB SERPL-MCNC: 0.83 MG/DL (ref 0.2–1)
BILIRUB UR QL STRIP: NEGATIVE
BUN SERPL-MCNC: 14 MG/DL (ref 5–25)
CALCIUM SERPL-MCNC: 9.5 MG/DL (ref 8.4–10.2)
CHLORIDE SERPL-SCNC: 105 MMOL/L (ref 96–108)
CLARITY UR: CLEAR
CO2 SERPL-SCNC: 26 MMOL/L (ref 21–32)
COLOR UR: ABNORMAL
CREAT SERPL-MCNC: 0.66 MG/DL (ref 0.6–1.3)
EOSINOPHIL # BLD AUTO: 0.04 THOUSAND/ΜL (ref 0–0.61)
EOSINOPHIL NFR BLD AUTO: 1 % (ref 0–6)
ERYTHROCYTE [DISTWIDTH] IN BLOOD BY AUTOMATED COUNT: 11.7 % (ref 11.6–15.1)
GFR SERPL CREATININE-BSD FRML MDRD: 115 ML/MIN/1.73SQ M
GLUCOSE SERPL-MCNC: 95 MG/DL (ref 65–140)
GLUCOSE UR STRIP-MCNC: NEGATIVE MG/DL
HCG SERPL QL: NEGATIVE
HCT VFR BLD AUTO: 39.8 % (ref 34.8–46.1)
HGB BLD-MCNC: 14.3 G/DL (ref 11.5–15.4)
HGB UR QL STRIP.AUTO: NEGATIVE
IMM GRANULOCYTES # BLD AUTO: 0.03 THOUSAND/UL (ref 0–0.2)
IMM GRANULOCYTES NFR BLD AUTO: 0 % (ref 0–2)
INR PPP: 1.01 (ref 0.84–1.19)
KETONES UR STRIP-MCNC: NEGATIVE MG/DL
LEUKOCYTE ESTERASE UR QL STRIP: NEGATIVE
LIPASE SERPL-CCNC: 15 U/L (ref 11–82)
LYMPHOCYTES # BLD AUTO: 1.19 THOUSANDS/ΜL (ref 0.6–4.47)
LYMPHOCYTES NFR BLD AUTO: 18 % (ref 14–44)
MCH RBC QN AUTO: 31.2 PG (ref 26.8–34.3)
MCHC RBC AUTO-ENTMCNC: 35.9 G/DL (ref 31.4–37.4)
MCV RBC AUTO: 87 FL (ref 82–98)
MONOCYTES # BLD AUTO: 0.47 THOUSAND/ΜL (ref 0.17–1.22)
MONOCYTES NFR BLD AUTO: 7 % (ref 4–12)
NEUTROPHILS # BLD AUTO: 4.95 THOUSANDS/ΜL (ref 1.85–7.62)
NEUTS SEG NFR BLD AUTO: 74 % (ref 43–75)
NITRITE UR QL STRIP: NEGATIVE
NRBC BLD AUTO-RTO: 0 /100 WBCS
P AXIS: 84 DEGREES
PH UR STRIP.AUTO: 6 [PH]
PLATELET # BLD AUTO: 218 THOUSANDS/UL (ref 149–390)
PMV BLD AUTO: 9.6 FL (ref 8.9–12.7)
POTASSIUM SERPL-SCNC: 4 MMOL/L (ref 3.5–5.3)
PR INTERVAL: 168 MS
PROT SERPL-MCNC: 7 G/DL (ref 6.4–8.4)
PROT UR STRIP-MCNC: NEGATIVE MG/DL
PROTHROMBIN TIME: 13.2 SECONDS (ref 11.6–14.5)
QRS AXIS: 105 DEGREES
QRSD INTERVAL: 82 MS
QT INTERVAL: 352 MS
QTC INTERVAL: 432 MS
RBC # BLD AUTO: 4.58 MILLION/UL (ref 3.81–5.12)
SODIUM SERPL-SCNC: 140 MMOL/L (ref 135–147)
SP GR UR STRIP.AUTO: <=1.005 (ref 1–1.03)
T WAVE AXIS: 79 DEGREES
UROBILINOGEN UR QL STRIP.AUTO: 0.2 E.U./DL
VENTRICULAR RATE: 91 BPM
WBC # BLD AUTO: 6.7 THOUSAND/UL (ref 4.31–10.16)

## 2022-02-24 PROCEDURE — 36415 COLL VENOUS BLD VENIPUNCTURE: CPT | Performed by: EMERGENCY MEDICINE

## 2022-02-24 PROCEDURE — 96375 TX/PRO/DX INJ NEW DRUG ADDON: CPT

## 2022-02-24 PROCEDURE — 81003 URINALYSIS AUTO W/O SCOPE: CPT | Performed by: EMERGENCY MEDICINE

## 2022-02-24 PROCEDURE — 80053 COMPREHEN METABOLIC PANEL: CPT | Performed by: EMERGENCY MEDICINE

## 2022-02-24 PROCEDURE — G1004 CDSM NDSC: HCPCS

## 2022-02-24 PROCEDURE — 93005 ELECTROCARDIOGRAM TRACING: CPT

## 2022-02-24 PROCEDURE — 85610 PROTHROMBIN TIME: CPT | Performed by: EMERGENCY MEDICINE

## 2022-02-24 PROCEDURE — 96361 HYDRATE IV INFUSION ADD-ON: CPT

## 2022-02-24 PROCEDURE — 85025 COMPLETE CBC W/AUTO DIFF WBC: CPT | Performed by: EMERGENCY MEDICINE

## 2022-02-24 PROCEDURE — 96374 THER/PROPH/DIAG INJ IV PUSH: CPT

## 2022-02-24 PROCEDURE — 99285 EMERGENCY DEPT VISIT HI MDM: CPT | Performed by: EMERGENCY MEDICINE

## 2022-02-24 PROCEDURE — 93010 ELECTROCARDIOGRAM REPORT: CPT | Performed by: INTERNAL MEDICINE

## 2022-02-24 PROCEDURE — 83690 ASSAY OF LIPASE: CPT | Performed by: EMERGENCY MEDICINE

## 2022-02-24 PROCEDURE — 99285 EMERGENCY DEPT VISIT HI MDM: CPT

## 2022-02-24 PROCEDURE — 74177 CT ABD & PELVIS W/CONTRAST: CPT

## 2022-02-24 PROCEDURE — 84703 CHORIONIC GONADOTROPIN ASSAY: CPT | Performed by: EMERGENCY MEDICINE

## 2022-02-24 PROCEDURE — 85730 THROMBOPLASTIN TIME PARTIAL: CPT | Performed by: EMERGENCY MEDICINE

## 2022-02-24 RX ORDER — ONDANSETRON 4 MG/1
4 TABLET, ORALLY DISINTEGRATING ORAL EVERY 6 HOURS PRN
Qty: 20 TABLET | Refills: 0 | Status: SHIPPED | OUTPATIENT
Start: 2022-02-24

## 2022-02-24 RX ORDER — MECLIZINE HYDROCHLORIDE 25 MG/1
25 TABLET ORAL 3 TIMES DAILY PRN
Qty: 30 TABLET | Refills: 0 | Status: SHIPPED | OUTPATIENT
Start: 2022-02-24

## 2022-02-24 RX ORDER — ONDANSETRON 2 MG/ML
4 INJECTION INTRAMUSCULAR; INTRAVENOUS ONCE
Status: COMPLETED | OUTPATIENT
Start: 2022-02-24 | End: 2022-02-24

## 2022-02-24 RX ORDER — LORAZEPAM 2 MG/ML
1 INJECTION INTRAMUSCULAR ONCE
Status: COMPLETED | OUTPATIENT
Start: 2022-02-24 | End: 2022-02-24

## 2022-02-24 RX ADMIN — ONDANSETRON 4 MG: 2 INJECTION INTRAMUSCULAR; INTRAVENOUS at 07:32

## 2022-02-24 RX ADMIN — LORAZEPAM 1 MG: 2 INJECTION INTRAMUSCULAR; INTRAVENOUS at 07:32

## 2022-02-24 RX ADMIN — SODIUM CHLORIDE 1000 ML: 0.9 INJECTION, SOLUTION INTRAVENOUS at 07:30

## 2022-02-24 RX ADMIN — IOHEXOL 100 ML: 350 INJECTION, SOLUTION INTRAVENOUS at 08:32

## 2022-02-24 NOTE — ED PROCEDURE NOTE
PROCEDURE  ECG 12 Lead Documentation Only    Date/Time: 2/24/2022 6:12 AM  Performed by: Kami Bentley MD  Authorized by: Kami Bentley MD     Indications / Diagnosis:  Vertigo   ECG reviewed by me, the ED Provider: yes    Patient location:  ED  Previous ECG:     Comparison to cardiac monitor: Yes    Interpretation:     Interpretation: normal    Rate:     ECG rate:  91    ECG rate assessment: normal    Rhythm:     Rhythm: sinus rhythm    Ectopy:     Ectopy: none    QRS:     QRS axis:  Right    QRS intervals:  Normal  Conduction:     Conduction: normal    ST segments:     ST segments:  Non-specific  T waves:     T waves: non-specific           Kami Bentley MD  02/24/22 8862

## 2022-02-24 NOTE — Clinical Note
Micaelacabrera Muñiz was seen and treated in our emergency department on 2/24/2022  Diagnosis: vertigo    Denisha N  may return to work on return date  She may return on this date: 02/25/2022         If you have any questions or concerns, please don't hesitate to call        Mony Chappell,     ______________________________           _______________          _______________  Hospital Representative                              Date                                Time

## 2022-02-24 NOTE — ED PROVIDER NOTES
History  Chief Complaint   Patient presents with    Abdominal Pain     LLQ    Dizziness     31YEAR-OLD FEMALE    PMH:  Vertigo 13 5 years ago    CC: dizziness    HPI:  Symptoms started suddenly around 3am this morning, woke her from sleep  Abdominal pain started around 10pm last night    Mode of arrival:   EMS      Pt has no change in vision  No unilateral weakness/numbness      Denies Diaphoresis  PATIENT HAS NO COMPLAINTS OF CHEST PAIN OR SHORTNESS OF BREATH  SHE DENIES PAIN WITH DEEP BREATH        TRAUMA:  NONE  PATIENT DID NOT FALL OR HIT HER HEAD  SHE IS MOVING ALL EXTREMITIES WITH EQUAL  STRENGTH AND 5/5 STRENGTH THROUGHOUT  PATIENT DENIES FEVERS, REPORTS CHILLS  NO SINUS SYMPTOMS  NO HEADACHE OR NECK PAIN  SHE DENIES SORE THROAT, DENIES SINUS CONGESTION       VTE  RISK FACTORS:  NONE   NO HISTORY OF PE OR DVT   NO LONG CAR RIDES OR PLANE RIDES  DENIES HEMOPTYSIS  OTHER ASSOCIATED SYMPTOMS:  NO VOMITING  NO STOOL CHANGES    No back or bloody stools      NO URINARY COMPLAINTS:   NO DYSURIA, NO HEMATURIA, NO FREQUENCY                    History provided by:  Patient  Dizziness  Quality:  Vertigo  Severity:  Moderate  Chronicity:  New  Relieved by:  Nothing  Worsened by:  Nothing  Ineffective treatments:  None tried  Associated symptoms: nausea    Associated symptoms: no blood in stool, no chest pain, no diarrhea, no headaches, no hearing loss, no palpitations, no shortness of breath, no syncope, no tinnitus, no vision changes, no vomiting and no weakness    Abdominal Pain  Pain location:  Generalized  Pain quality: not aching    Pain severity:  Moderate  Onset quality:  Gradual  Chronicity:  New  Relieved by:  Nothing  Worsened by:  Nothing  Ineffective treatments:  None tried  Associated symptoms: nausea    Associated symptoms: no chest pain, no chills, no constipation, no cough, no diarrhea, no dysuria, no fatigue, no fever, no hematemesis, no hematuria, no melena, no shortness of breath, no sore throat, no vaginal bleeding, no vaginal discharge and no vomiting        Prior to Admission Medications   Prescriptions Last Dose Informant Patient Reported? Taking? Cromolyn Sodium (NASAL ALLERGY NA)   Yes No   Sig: into each nostril daily as needed    acetaminophen-codeine (TYLENOL/CODEINE #3) 300-30 MG per tablet   Yes No   Sig: Take 1 tablet by mouth as needed   cetirizine (ZyrTEC) 10 mg tablet   Yes No   Sig: Take 10 mg by mouth daily   citalopram (CeleXA) 20 mg tablet   Yes No   Sig: Take 20 mg by mouth daily   fexofenadine (ALLEGRA) 180 MG tablet   Yes No   Sig: Take 180 mg by mouth daily   phenylephrine (SUDAFED PE) 10 MG TABS   Yes No   Sig: Take 10 mg by mouth every 4 (four) hours as needed for congestion      Facility-Administered Medications: None       Past Medical History:   Diagnosis Date    Anxiety     Environmental and seasonal allergies     Migraine     Palpitations        Past Surgical History:   Procedure Laterality Date    BREAST SURGERY      WISDOM TOOTH EXTRACTION         History reviewed  No pertinent family history  I have reviewed and agree with the history as documented  E-Cigarette/Vaping    E-Cigarette Use Never User      E-Cigarette/Vaping Substances     Social History     Tobacco Use    Smoking status: Never Smoker    Smokeless tobacco: Never Used   Vaping Use    Vaping Use: Never used   Substance Use Topics    Alcohol use: Not Currently     Comment: socially    Drug use: Yes     Types: Marijuana       Review of Systems   Constitutional: Negative for chills, diaphoresis, fatigue and fever  HENT: Negative for congestion, dental problem, drooling, ear discharge, ear pain, facial swelling, hearing loss, mouth sores, nosebleeds, postnasal drip, rhinorrhea, sinus pressure, sinus pain, sneezing, sore throat, tinnitus, trouble swallowing and voice change      Eyes: Negative for photophobia, pain, discharge, redness, itching and visual disturbance  Respiratory: Negative for cough, chest tightness, shortness of breath, wheezing and stridor  Cardiovascular: Negative for chest pain, palpitations, leg swelling and syncope  Gastrointestinal: Positive for abdominal pain and nausea  Negative for blood in stool, constipation, diarrhea, hematemesis, melena and vomiting  Genitourinary: Negative for decreased urine volume, difficulty urinating, dysuria, flank pain, frequency, hematuria, menstrual problem, urgency, vaginal bleeding, vaginal discharge and vaginal pain  Musculoskeletal: Negative for arthralgias, back pain, gait problem, joint swelling, myalgias, neck pain and neck stiffness  Skin: Negative for rash and wound  Neurological: Positive for dizziness  Negative for seizures, syncope, speech difficulty, weakness, light-headedness, numbness and headaches  Hematological: Negative for adenopathy  Does not bruise/bleed easily  All other systems reviewed and are negative  Physical Exam  Physical Exam  Constitutional:       General: She is not in acute distress  Appearance: She is well-developed  She is ill-appearing (due to dizziness )  She is not toxic-appearing or diaphoretic  HENT:      Head: Normocephalic and atraumatic  Nose: Nose normal       Mouth/Throat:      Pharynx: No oropharyngeal exudate  Eyes:      General: No scleral icterus  Right eye: No discharge  Left eye: No discharge  Extraocular Movements: Extraocular movements intact  Conjunctiva/sclera: Conjunctivae normal       Pupils: Pupils are equal, round, and reactive to light  Neck:      Vascular: No JVD  Trachea: No tracheal deviation  Cardiovascular:      Rate and Rhythm: Normal rate and regular rhythm  Heart sounds: Normal heart sounds  No murmur heard  No friction rub  No gallop  Pulmonary:      Effort: Pulmonary effort is normal  No respiratory distress  Breath sounds: Normal breath sounds  No stridor  No wheezing, rhonchi or rales  Chest:      Chest wall: No tenderness  Abdominal:      General: Bowel sounds are normal  There is no distension  Palpations: Abdomen is soft  There is no mass  Tenderness: There is no abdominal tenderness  There is no right CVA tenderness, left CVA tenderness, guarding or rebound  Negative signs include Quach's sign, Rovsing's sign, McBurney's sign, psoas sign and obturator sign  Hernia: No hernia is present  Musculoskeletal:         General: No tenderness or deformity  Normal range of motion  Cervical back: Normal range of motion and neck supple  Lymphadenopathy:      Cervical: No cervical adenopathy  Skin:     General: Skin is warm  Capillary Refill: Capillary refill takes less than 2 seconds  Coloration: Skin is not cyanotic, jaundiced, mottled or pale  Findings: No erythema or rash  Neurological:      General: No focal deficit present  Mental Status: She is alert and oriented to person, place, and time  Cranial Nerves: No cranial nerve deficit  Sensory: No sensory deficit  Motor: No abnormal muscle tone  Coordination: Coordination normal    Psychiatric:         Mood and Affect: Mood is anxious  Behavior: Behavior normal          Thought Content:  Thought content normal          Judgment: Judgment normal          Vital Signs  ED Triage Vitals [02/24/22 0603]   Temperature Pulse Respirations Blood Pressure SpO2   97 6 °F (36 4 °C) 94 18 143/64 96 %      Temp Source Heart Rate Source Patient Position - Orthostatic VS BP Location FiO2 (%)   Tympanic Monitor Sitting Left arm --      Pain Score       4           Vitals:    02/24/22 0603   BP: 143/64   Pulse: 94   Patient Position - Orthostatic VS: Sitting         Visual Acuity      ED Medications  Medications   sodium chloride 0 9 % bolus 1,000 mL (has no administration in time range)   LORazepam (ATIVAN) injection 1 mg (has no administration in time range) ondansetron [facility] injection 4 mg (has no administration in time range)       Diagnostic Studies  Results Reviewed     Procedure Component Value Units Date/Time    CBC and differential [[id number]]     Lab Status: No result Specimen: Blood     CMP [[id number]]     Lab Status: No result Specimen: Blood     Lipase [[id number]]     Lab Status: No result Specimen: Blood     hCG, qualitative pregnancy [[id number]]     Lab Status: No result Specimen: Blood     UA w Reflex to Microscopic w Reflex to Culture [[id number]]     Lab Status: No result Specimen: Urine                  CT abdomen pelvis with contrast    (Results Pending)              Procedures  Procedures         ED Course  ED Course as of 02/24/22 0659   Thu Feb 24, 2022   1893 Sign out:     Eleazar Cramer  Will f/u on Labs and pts clinical status                                              MDM    Disposition  Final diagnoses:   None     ED Disposition     None      Follow-up Information    None         Patient's Medications   Discharge Prescriptions    No medications on file       No discharge procedures on file      PDMP Review     None          ED Provider  Electronically Signed by           Zhao Stroud MD  02/24/22 9434

## 2022-03-01 ENCOUNTER — EVALUATION (OUTPATIENT)
Dept: PHYSICAL THERAPY | Facility: CLINIC | Age: 37
End: 2022-03-01
Payer: COMMERCIAL

## 2022-03-01 DIAGNOSIS — H81.11 BPPV (BENIGN PAROXYSMAL POSITIONAL VERTIGO), RIGHT: Primary | ICD-10-CM

## 2022-03-01 DIAGNOSIS — R26.89 IMBALANCE: ICD-10-CM

## 2022-03-01 DIAGNOSIS — R42 VERTIGO: ICD-10-CM

## 2022-03-01 PROCEDURE — 97161 PT EVAL LOW COMPLEX 20 MIN: CPT | Performed by: PHYSICAL MEDICINE & REHABILITATION

## 2022-03-01 PROCEDURE — 97530 THERAPEUTIC ACTIVITIES: CPT | Performed by: PHYSICAL MEDICINE & REHABILITATION

## 2022-03-01 PROCEDURE — 95992 CANALITH REPOSITIONING PROC: CPT | Performed by: PHYSICAL MEDICINE & REHABILITATION

## 2022-03-01 NOTE — LETTER
2022    [phone]    Patient: José Muñiz   YOB: 1985   Date of Visit: 3/1/2022     Encounter Diagnosis     ICD-10-CM    1  BPPV (benign paroxysmal positional vertigo), right  H81 11    2  Vertigo  R42 Ambulatory Referral to Physical Therapy   3  Imbalance  R26 89        Dear Dr Kent Postin:    Thank you for your recent referral of José Muñiz  Please review the attached evaluation summary from [facility]'s recent visit  Please verify that you agree with the plan of care by signing the attached order  If you have any questions or concerns, please do not hesitate to call  I sincerely appreciate the opportunity to share in the care of one of your patients and hope to have another opportunity to work with you in the near future  Sincerely,    Laura Rivers, PT      Referring Provider:      I certify that I have read the below Plan of Care and certify the need for these services furnished under this plan of treatment while under my care  DO Miky [place] [address]  Via Fax: [phone]          PT Evaluation     Today's date: 3/1/2022  Patient name: Jsoé Muñiz  : 1985  MRN: [id number]  Referring provider: Nay Combs DO  Dx:   Encounter Diagnosis     ICD-10-CM    1  BPPV (benign paroxysmal positional vertigo), right  H81 11    2  Vertigo  R42 Ambulatory Referral to Physical Therapy   3  Imbalance  R26 89                   Assessment  Assessment details: Patient is a 31year old Female who presents with s/s consistent with R horizontal canal BPPV  Based on presentation the patient has a positive R Roll test which indicates right horizontal canal canalithiasis; pt with difficulty keeping eyes open during assessment/tx, thus naming of /direction of nystagmus was difficult to discern today; will cont to monitor and tx prn/as appropriate    Patient responded well to a particle repositioning technique for right horizontal canal canalithiasis after 1 trial   At end of session patient noted improvement in sx with resolution of constant sensation of swaying/unsteadiness at rest and improved tolerance for mobility in clinic; f/u R/L roll testing was also noted to be negative after tx  She was instructed to gradually resume activities to tolerance and to cont to monitor her sx  Patient will call clinic with questions or concerns  She will return to clinic this Thursday for f/u/ re-evaluation and further tx and HEP prn  Extensive pt education provided today for pathophysiology of vertigo and horizontal BPPV and how this relates to all her present sx/complaints; positive reassurance for recovery/improvement in sx was provided; pt noted understanding with no questions/concerns end of session  Impairments: abnormal gait, abnormal movement, activity intolerance and impaired balance  Other impairment: Dizziness, reduced gaze stabilization     Symptom irritability: highUnderstanding of Dx/Px/POC: good   Prognosis: good    Goals  In 1-2 weeks, patient will be:  1  Independent in basic HEP for balance  2  Low fall risk on balance assessment    In 3-4 weeks, patient will be:  3  Independent with self correction techniques  4  Free from s/s of BPPV  horizontal canals canalithiasis bilaterally  5  Pt will demonstrate normal gaze stabilization with normal R/L head thrust test to improve ability to shop and perform ADLs without sx or visual disturbance  6  Pt will demonstrate WNL balance assessment as per MCTSIB to improve her safety with mobility and ADLs          Plan  Patient would benefit from: skilled physical therapy  Planned therapy interventions: balance, canalith repositioning, motor coordination training, neuromuscular re-education, therapeutic exercise, therapeutic activities, home exercise program and graded exercise  Frequency: 2x week  Duration in visits: 8  Duration in weeks: 6  Plan of Care beginning date: 3/1/2022  Plan of Care expiration date: 3/31/2022  Treatment plan discussed with: patient        Subjective Evaluation    History of Present Illness  Mechanism of injury: Pt notes she awoke out her sleep suddenly with vertigo on 2/24  Felt like her "bed was on a boat" and the the room was spinning  Notes these sx persisted for hours/days  Was constantly feeling like she was unsteady and "walking on a boat" and off balance  Also noted some visual disturbance and ear ringing  Had to move very slowly  All movement made sx worse  Went to the ER with stomach pain and vertigo; CT scan stomach done which was negative; given Zofran and Meclizine  Took medication 2x at day of d/c from hospital then stopped taking as they made her tired; called express care Saturday due to cont'd sx and onset of R ear pain; given Rx for  Antibiotic which she has been taking  Overall feels her sx are improving, especially if she is not moving too much  PMHx significant for migraines; was told she had vertigo ~14 years ago after child birth; lasted a short time without tx  Hx of mild ear infections; hard to dx for her per pt; has to go to ENT as she has "small twisty ear canals"  Still has burning pain in R ear; pretty constant; intermittently sharp but rare  Some pressure/fullness in R ear  Denies change in hearing  Occasionally blurry /double vision which is abnormal for her; was worse when sx started  Chronic neck and back pain; no change  No n/t  Does get nauseated with dizziness only  Present sx are noted to be as a "weird aura of waviness"/ unsteadiness/feeling off; this is constant  Gets dizzy when looking up and gets nauseated; also occurs with bending over to get something off floor; does not lay on her R side  Has to fly in 9 days and is very concerned about this  PCP visit upcoming as annual visit  Sees chriporactor every other week for her chronic neck and back pain   Hx of anxiety, especially relating to medications  Pain  Current pain ratin (R ear 2/10)  At best pain ratin (sometimes)  At worst pain ratin (R ear 5/10)  Location: Chronic neck pain C7 -T 2-3 region and between shoulder blades   Quality: burning, tight and sharp  Progression: improved    Treatments  Previous treatment: medication  Current treatment: medication and physical therapy  Current treatment comments: Antibiotic only   Patient Goals  Patient goals for therapy: improved balance  Patient's goals regarding treatment: Get rid of the dizziness         Objective     Concurrent Complaints  Positive for nausea/motion sickness, tinnitus (R ear, R ear pain), visual change (intermittent, getting better), memory loss, aural fullness and poor concentration  Negative for hearing loss and peripheral neuropathyHeadaches: Hx migraines, no change at presnet and no presnet episodes of migraines  Neurological Testing     Additional Neurological Details  B UE Light touch sensation intact and symmetrical B   - Pinon's test B     Active Range of Motion   Cervical/Thoracic Spine       Cervical    Flexion: Neck active flexion: WFL, guarded  Extension: Neck active extension: WFL, guarded, dizzy with ROM       Left lateral flexion: Neck active lateral bend left: WFL, apprehensive  Right lateral flexion: Neck active lateral bend right: WFL, apprehensive  Left rotation: Neck active rotation left: WFL, apprehensive  Right rotation: Neck active rotation right: WFL, apprehensive, dizzy, "eyes can't keep up"           Neuro Exam:     Dizziness  Positive for disequilibrium, vertigo, oscillopsia, motion sickness, light-headedness, rocking or swaying, diplopia and floating or swimming  Exacerbating factors  Positive for bending over, looking up, walking (sometimes, with stopping/turning, or too fast ), turning head, supine to/from sitting, optokinetic movement and walking in busy environment     Negative for rolling in bed  Symptoms   Duration: few minutes for the more intense sx, otherwise sx are mildly present all day   Frequency: constant/all day, consistently with movement     Headaches   Headaches: Hx migraines, no change at presnet and no presnet episodes of migraines  Cervical exam   Modified VBI   Left: symptomatic  Right: symptomatic  Seated posture: forward head posture and internally rotated shoulders    Oculomotor exam   Oculomotor ROM: WNL  Resting nystagmus: not present   Gaze holding nystagmus: not present left  and not present right  Smooth pursuits: within normal limits  Vertical saccades: normal  Horizontal saccades: normal  Convergence: normal  Head thrust: left normal  Head thrust: right abnormal ("eyes can't keep up")  Normal dynamic visual acuity: Unable to focus on static target with head movement R/L      Positional testing   [place]-Hallpike   Left posterior canal: WNL  Right posterior canal: WNL  Roll test   Left horizontal canal: ageotropic  Positional testing comment: MCTSIB TBA upcoming     Functional outcomes   Functional outcome gait comment: Grossly WNL, EXCEPT pt is slow/guarded with transfers and gait, especially with changing positions, turning, or negotiating obstacles; moves neck/trunk together with hesitancy to turn neck with gait/mobility              Precautions: anxiety      Re-eval Date: 4/12    Date 3/1       Visit Count 1       FOTO 3/1       Pain In See IE       Pain Out See IE           Manuals 3/1       R 270*BBQ roll for R horizontal BPPX 10' total tx  X 1 trial with pt education /guidance/feedback pre/post tx                                Neuro Re-Ed        Romberg        Tandem        SLS                                         Ther Ex                                                                        Ther Activity 15' total pt education regarding pathophysiology of BPPV as they relate to pt's present pain/sx and prognosis/expectations for recovery and PT tx and role of tx in resolving sx and restoring function                       Gait Training                        Modalities

## 2022-03-01 NOTE — PROGRESS NOTES
PT Evaluation     Today's date: 3/1/2022  Patient name: Julio Muñiz  : 1985  MRN: [id number]  Referring provider: Patrizia Hill DO  Dx:   Encounter Diagnosis     ICD-10-CM    1  BPPV (benign paroxysmal positional vertigo), right  H81 11    2  Vertigo  R42 Ambulatory Referral to Physical Therapy   3  Imbalance  R26 89                   Assessment  Assessment details: Patient is a 31year old Female who presents with s/s consistent with R horizontal canal BPPV  Based on presentation the patient has a positive R Roll test which indicates right horizontal canal canalithiasis; pt with difficulty keeping eyes open during assessment/tx, thus naming of /direction of nystagmus was difficult to discern today; will cont to monitor and tx prn/as appropriate  Patient responded well to a particle repositioning technique for right horizontal canal canalithiasis after 1 trial   At end of session patient noted improvement in sx with resolution of constant sensation of swaying/unsteadiness at rest and improved tolerance for mobility in clinic; f/u R/L roll testing was also noted to be negative after tx  She was instructed to gradually resume activities to tolerance and to cont to monitor her sx  Patient will call clinic with questions or concerns  She will return to clinic this Thursday for f/u/ re-evaluation and further tx and HEP prn  Extensive pt education provided today for pathophysiology of vertigo and horizontal BPPV and how this relates to all her present sx/complaints; positive reassurance for recovery/improvement in sx was provided; pt noted understanding with no questions/concerns end of session  Impairments: abnormal gait, abnormal movement, activity intolerance and impaired balance  Other impairment: Dizziness, reduced gaze stabilization     Symptom irritability: highUnderstanding of Dx/Px/POC: good   Prognosis: good    Goals  In 1-2 weeks, patient will be:  1    Independent in basic HEP for balance  2  Low fall risk on balance assessment    In 3-4 weeks, patient will be:  3  Independent with self correction techniques  4  Free from s/s of BPPV  horizontal canals canalithiasis bilaterally  5  Pt will demonstrate normal gaze stabilization with normal R/L head thrust test to improve ability to shop and perform ADLs without sx or visual disturbance  6  Pt will demonstrate WNL balance assessment as per MCTSIB to improve her safety with mobility and ADLs  Plan  Patient would benefit from: skilled physical therapy  Planned therapy interventions: balance, canalith repositioning, motor coordination training, neuromuscular re-education, therapeutic exercise, therapeutic activities, home exercise program and graded exercise  Frequency: 2x week  Duration in visits: 8  Duration in weeks: 6  Plan of Care beginning date: 3/1/2022  Plan of Care expiration date: 3/31/2022  Treatment plan discussed with: patient        Subjective Evaluation    History of Present Illness  Mechanism of injury: Pt notes she awoke out her sleep suddenly with vertigo on 2/24  Felt like her "bed was on a boat" and the the room was spinning  Notes these sx persisted for hours/days  Was constantly feeling like she was unsteady and "walking on a boat" and off balance  Also noted some visual disturbance and ear ringing  Had to move very slowly  All movement made sx worse  Went to the ER with stomach pain and vertigo; CT scan stomach done which was negative; given Zofran and Meclizine  Took medication 2x at day of d/c from hospital then stopped taking as they made her tired; called express care Saturday due to cont'd sx and onset of R ear pain; given Rx for  Antibiotic which she has been taking  Overall feels her sx are improving, especially if she is not moving too much  PMHx significant for migraines; was told she had vertigo ~14 years ago after child birth; lasted a short time without tx   Hx of mild ear infections; hard to dx for her per pt; has to go to ENT as she has "small twisty ear canals"  Still has burning pain in R ear; pretty constant; intermittently sharp but rare  Some pressure/fullness in R ear  Denies change in hearing  Occasionally blurry /double vision which is abnormal for her; was worse when sx started  Chronic neck and back pain; no change  No n/t  Does get nauseated with dizziness only  Present sx are noted to be as a "weird aura of waviness"/ unsteadiness/feeling off; this is constant  Gets dizzy when looking up and gets nauseated; also occurs with bending over to get something off floor; does not lay on her R side  Has to fly in 9 days and is very concerned about this  PCP visit upcoming as annual visit  Sees chriporactor every other week for her chronic neck and back pain  Hx of anxiety, especially relating to medications  Pain  Current pain ratin (R ear 2/10)  At best pain ratin (sometimes)  At worst pain ratin (R ear 5/10)  Location: Chronic neck pain C7 -T 2-3 region and between shoulder blades   Quality: burning, tight and sharp  Progression: improved    Treatments  Previous treatment: medication  Current treatment: medication and physical therapy  Current treatment comments: Antibiotic only   Patient Goals  Patient goals for therapy: improved balance  Patient's goals regarding treatment: Get rid of the dizziness         Objective     Concurrent Complaints  Positive for nausea/motion sickness, tinnitus (R ear, R ear pain), visual change (intermittent, getting better), memory loss, aural fullness and poor concentration  Negative for hearing loss and peripheral neuropathyHeadaches: Hx migraines, no change at presnet and no presnet episodes of migraines  Neurological Testing     Additional Neurological Details  B UE Light touch sensation intact and symmetrical B   - Pinon's test B     Active Range of Motion   Cervical/Thoracic Spine       Cervical    Flexion: Neck active flexion: WFL, guarded  Extension: Neck active extension: WFL, guarded, dizzy with ROM       Left lateral flexion: Neck active lateral bend left: WFL, apprehensive  Right lateral flexion: Neck active lateral bend right: WFL, apprehensive  Left rotation: Neck active rotation left: WFL, apprehensive  Right rotation: Neck active rotation right: WFL, apprehensive, dizzy, "eyes can't keep up"           Neuro Exam:     Dizziness  Positive for disequilibrium, vertigo, oscillopsia, motion sickness, light-headedness, rocking or swaying, diplopia and floating or swimming  Exacerbating factors  Positive for bending over, looking up, walking (sometimes, with stopping/turning, or too fast ), turning head, supine to/from sitting, optokinetic movement and walking in busy environment  Negative for rolling in bed  Symptoms   Duration: few minutes for the more intense sx, otherwise sx are mildly present all day   Frequency: constant/all day, consistently with movement     Headaches   Headaches: Hx migraines, no change at presnet and no presnet episodes of migraines  Cervical exam   Modified VBI   Left: symptomatic  Right: symptomatic  Seated posture: forward head posture and internally rotated shoulders    Oculomotor exam   Oculomotor ROM: WNL  Resting nystagmus: not present   Gaze holding nystagmus: not present left  and not present right  Smooth pursuits: within normal limits  Vertical saccades: normal  Horizontal saccades: normal  Convergence: normal  Head thrust: left normal  Head thrust: right abnormal ("eyes can't keep up")  Normal dynamic visual acuity: Unable to focus on static target with head movement R/L      Positional testing   [place]-Hallpike   Left posterior canal: WNL  Right posterior canal: WNL  Roll test   Left horizontal canal: ageotropic  Positional testing comment: MCTSIB TBA upcoming     Functional outcomes   Functional outcome gait comment: Grossly WNL, EXCEPT pt is slow/guarded with transfers and gait, especially with changing positions, turning, or negotiating obstacles; moves neck/trunk together with hesitancy to turn neck with gait/mobility              Precautions: anxiety      Re-eval Date: 4/12    Date 3/1       Visit Count 1       FOTO 3/1       Pain In See IE       Pain Out See IE           Manuals 3/1       R 270*BBQ roll for R horizontal BPPX 10' total tx  X 1 trial with pt education /guidance/feedback pre/post tx                                Neuro Re-Ed        Romberg        Tandem        SLS                                         Ther Ex                                                                        Ther Activity 15' total pt education regarding pathophysiology of BPPV as they relate to pt's present pain/sx and prognosis/expectations for recovery and PT tx and role of tx in resolving sx and restoring function                       Gait Training                        Modalities

## 2022-03-03 ENCOUNTER — OFFICE VISIT (OUTPATIENT)
Dept: PHYSICAL THERAPY | Facility: CLINIC | Age: 37
End: 2022-03-03
Payer: COMMERCIAL

## 2022-03-03 DIAGNOSIS — R42 VERTIGO: ICD-10-CM

## 2022-03-03 DIAGNOSIS — H81.11 BPPV (BENIGN PAROXYSMAL POSITIONAL VERTIGO), RIGHT: Primary | ICD-10-CM

## 2022-03-03 DIAGNOSIS — R26.89 IMBALANCE: ICD-10-CM

## 2022-03-03 PROCEDURE — 95992 CANALITH REPOSITIONING PROC: CPT | Performed by: PHYSICAL MEDICINE & REHABILITATION

## 2022-03-03 PROCEDURE — 97112 NEUROMUSCULAR REEDUCATION: CPT | Performed by: PHYSICAL MEDICINE & REHABILITATION

## 2022-03-03 NOTE — PROGRESS NOTES
Daily Note     Today's date: 3/3/2022  Patient name: Cari Muñiz  : 1985  MRN: [id number]  Referring provider: Suni Avila DO  Dx:   Encounter Diagnosis     ICD-10-CM    1  BPPV (benign paroxysmal positional vertigo), right  H81 11    2  Vertigo  R42    3  Imbalance  R26 89                   Subjective: Pt with no new sx/complaints  She reports she is 75-80% better overall and has been feeling better since last tx  Notes she has been less dizzy with movement with improved tolerance for mobility, transfers, etc  She notes she is no longer constantly feeling off  Her R ear is mildly burning but not as bad as it was  Still notes "fluid" in R ear  Still taking antibiotic  Relieved that she is feeling better but still anxious about an upcoming trip she has on the ; has to fly to [facility]  She notes she still feels her vision is a little off but notes it is better  If she turns/moves quickly, notes her vision sometimes doesn't keep up  Objective: See treatment diary below      Assessment: Tolerated treatment well  Pt reports overall improvement in sx since last tx  Negative R/L [place] Money for posterior canal BPPV  Negative L/R roll test for concordant signs/sx or nystagmus relating to horizontal canal BPPV  She did note her "eyes wouldn't catch up" briefly with R roll test, however this lasted <1 second and she had no other sx during/after testing  Performed and reviewed R * roll for self tx to perform prn until sx resolve completely; pt demonstrated understanding  She demonstrates cont + R head thrust however she noted the visual blurring was less than when tested last session; min saccadic correction noted with R head thrust  Performed, issued, and reviewed VOR x 1 exercises standing with near and far target, increasing head speed as able; educated pt to keep head speed fast enough to be challenging (inducing retinal slip) but not to the point she is unable to see target   Also performed and reviewed how to progress/advance VOR exercises with self movement (walking) or with a busy or moving background (in font of window, oscillating fan etc)  Pt reports cont'd signs/sx consistent with visual motion sensitivity as well  Also reviewed for HEP performing visual motion habituation activities such as using her kid's VR headsets to tolerance (seated at first for safety) in order to facilitate reduction in her visual motion sensitivity with graded exposure; understanding noted  No questions/concerns after tx  Patient demonstrated fatigue post treatment and would benefit from continued PT      Plan: Continue per plan of care  Progress treatment as tolerated  Pt will be away next week for a conference  She is going to perform HEP and contact PT if she has any questions/concerns  She will contact PT when she returns from her trip        Precautions: anxiety      Re-eval Date: 4/12    Date 3/1 3/3      Visit Count 1 2      FOTO 3/1       Pain In See IE 0/10      Pain Out See IE 0/10          Manuals 3/1 3/3      R 270*BBQ roll for R horizontal BPPX 10' total tx  X 1 trial with pt education /guidance/feedback pre/post tx  10' total tx  X 1 trial with pt education /guidance/feedback pre/post tx   360* BBQ roll  Reviewed for HEP                               Neuro Re-Ed        Romberg        Tandem        SLS           VOR x1 horiz and vertical near and far target   Standing  Floor  1'x 1 ea     Walking VOR x 1 horiz/vertical  Self pace  3 trials each 3-5 steps ea fwd/retro        VOR x 1 horiz/vert with busy/moving background (window) standing   1x1' ea         Visual motion habituation   Reviewed for HEP  (VR head set, oscillating fan, moving background, etc)               Ther Ex                                                                        Ther Activity 15' total pt education regarding pathophysiology of BPPV as they relate to pt's present pain/sx and prognosis/expectations for recovery and PT tx and role of tx in resolving sx and restoring function                       Gait Training                        Modalities

## 2022-05-04 ENCOUNTER — OFFICE VISIT (OUTPATIENT)
Dept: URGENT CARE | Facility: MEDICAL CENTER | Age: 37
End: 2022-05-04
Payer: COMMERCIAL

## 2022-05-04 VITALS
RESPIRATION RATE: 16 BRPM | OXYGEN SATURATION: 99 % | HEART RATE: 84 BPM | TEMPERATURE: 98.9 F | DIASTOLIC BLOOD PRESSURE: 77 MMHG | SYSTOLIC BLOOD PRESSURE: 109 MMHG

## 2022-05-04 DIAGNOSIS — J01.90 ACUTE NON-RECURRENT SINUSITIS, UNSPECIFIED LOCATION: Primary | ICD-10-CM

## 2022-05-04 PROCEDURE — S9088 SERVICES PROVIDED IN URGENT: HCPCS | Performed by: PHYSICIAN ASSISTANT

## 2022-05-04 PROCEDURE — 99213 OFFICE O/P EST LOW 20 MIN: CPT | Performed by: PHYSICIAN ASSISTANT

## 2022-05-04 RX ORDER — SUMATRIPTAN 100 MG/1
TABLET, FILM COATED ORAL
COMMUNITY
Start: 2022-04-26

## 2022-05-04 RX ORDER — IBUPROFEN 200 MG
200 TABLET ORAL EVERY 6 HOURS PRN
COMMUNITY

## 2022-05-04 RX ORDER — AMOXICILLIN AND CLAVULANATE POTASSIUM 875; 125 MG/1; MG/1
1 TABLET, FILM COATED ORAL EVERY 12 HOURS SCHEDULED
Qty: 20 TABLET | Refills: 0 | Status: SHIPPED | OUTPATIENT
Start: 2022-05-04 | End: 2022-05-14

## 2022-05-04 RX ORDER — DICYCLOMINE HYDROCHLORIDE 10 MG/1
10 CAPSULE ORAL
COMMUNITY
Start: 2022-03-16

## 2022-05-04 RX ORDER — DIPHENHYDRAMINE HCL 25 MG
25 CAPSULE ORAL EVERY 6 HOURS PRN
COMMUNITY

## 2022-05-04 RX ORDER — MAGNESIUM OXIDE/MAG AA CHELATE 133 MG
250 TABLET ORAL DAILY
COMMUNITY

## 2022-05-04 RX ORDER — ACETAMINOPHEN 500 MG
500 TABLET ORAL EVERY 6 HOURS PRN
COMMUNITY

## 2022-05-04 NOTE — PROGRESS NOTES
[facility]        NAME: Yemi Muñiz is a 30 y o  female  : 1985    MRN: [id number]  DATE: May 4, 2022  TIME: 11:12 AM    Assessment and Plan   Acute non-recurrent sinusitis, unspecified location [J01 90]  1  Acute non-recurrent sinusitis, unspecified location  amoxicillin-clavulanate (AUGMENTIN) 875-125 mg per tablet   Declined COVID and Flu testing      Patient Instructions       Patient was educated on sinus infection  Patient was placed on antibiotics  Patient was told to eat on antibiotics  IF symptoms persist follow up with PCP    Chief Complaint     Chief Complaint   Patient presents with    Cold Like Symptoms     Patient relates sinus pain that radiates to teeth/jaw and ear pain that is starting to "burn" that started about a week ago  Patient states that it may be due to allergy, but OTC treatment is not working  Pain rated 5/10  Patient is currently using nasal flushes and nasal spray to control symptoms  History of Present Illness       Patient is here today complaining Sinus pressure and pain in ear that woke her from sleep  Patient admits bad allergies and reports she has tried OTC treatment with no relief  Patient is allergic to no medications  Patient is vaccinated for COVID 19 with moderna      Review of Systems   Review of Systems   Constitutional: Negative  HENT: Positive for sinus pressure and sinus pain  Respiratory: Negative  Cardiovascular: Negative  Musculoskeletal: Negative  Psychiatric/Behavioral: Negative  Current Medications       Current Outpatient Medications:     dicyclomine (BENTYL) 10 mg capsule, Take 10 mg by mouth, Disp: , Rfl:     SUMAtriptan (IMITREX) 100 mg tablet, Take 1 tab PO, at the onset of Migraine  May repeat in 1 hour   Limit 2 tabs per day, 2 days per week, Disp: , Rfl:     acetaminophen (TYLENOL) 500 mg tablet, Take 500 mg by mouth every 6 (six) hours as needed, Disp: , Rfl:     acetaminophen-codeine (TYLENOL/CODEINE #3) 300-30 MG per tablet, Take 1 tablet by mouth as needed, Disp: , Rfl:     amoxicillin-clavulanate (AUGMENTIN) 875-125 mg per tablet, Take 1 tablet by mouth every 12 (twelve) hours for 10 days, Disp: 20 tablet, Rfl: 0    B Complex Vitamins (B COMPLEX 100 PO), Take 1 tablet by mouth daily, Disp: , Rfl:     cetirizine (ZyrTEC) 10 mg tablet, Take 10 mg by mouth daily, Disp: , Rfl:     citalopram (CeleXA) 20 mg tablet, Take 20 mg by mouth daily, Disp: , Rfl:     co-enzyme Q-10 30 MG capsule, Take 30 mg by mouth Three times a day, Disp: , Rfl:     Cromolyn Sodium (NASAL ALLERGY NA), into each nostril daily as needed , Disp: , Rfl:     diphenhydrAMINE (BENADRYL) 25 mg capsule, Take 25 mg by mouth every 6 (six) hours as needed, Disp: , Rfl:     fexofenadine (ALLEGRA) 180 MG tablet, Take 180 mg by mouth daily, Disp: , Rfl:     ibuprofen (MOTRIN) 200 mg tablet, Take 200 mg by mouth every 6 (six) hours as needed, Disp: , Rfl:     meclizine (ANTIVERT) 25 mg tablet, Take 1 tablet (25 mg total) by mouth 3 (three) times a day as needed for dizziness, Disp: 30 tablet, Rfl: 0    ondansetron (Zofran ODT) 4 mg disintegrating tablet, Take 1 tablet (4 mg total) by mouth every 6 (six) hours as needed for nausea or vomiting, Disp: 20 tablet, Rfl: 0    phenylephrine (SUDAFED PE) 10 MG TABS, Take 10 mg by mouth every 4 (four) hours as needed for congestion, Disp: , Rfl:     Specialty Vitamins Products (magnesium, amino acid chelate,) 133 MG tablet, Take 250 mg by mouth daily, Disp: , Rfl:     Current Allergies     Allergies as of 05/04/2022 - Reviewed 05/04/2022   Allergen Reaction Noted    Dust mite extract Sneezing 01/04/2021    Phenothiazines Other (See Comments) 09/13/2007    Thorazine [chlorpromazine] Other (See Comments) 06/13/2017            The following portions of the patient's history were reviewed and updated as appropriate: allergies, current medications, past family history, past medical history, past social history, past surgical history and problem list      Past Medical History:   Diagnosis Date    Anxiety     Environmental and seasonal allergies     Migraine     Palpitations        Past Surgical History:   Procedure Laterality Date    BREAST SURGERY      WISDOM TOOTH EXTRACTION         History reviewed  No pertinent family history  Medications have been verified  Objective   /77   Pulse 84   Temp 98 9 °F (37 2 °C)   Resp 16   LMP 04/27/2022   SpO2 99%   Patient's last menstrual period was 04/27/2022  Physical Exam     Physical Exam  Constitutional:       Appearance: She is normal weight  HENT:      Head: Normocephalic  Comments: Pain over frontal and maxillary sinus     Right Ear: Tympanic membrane, ear canal and external ear normal       Left Ear: Tympanic membrane, ear canal and external ear normal       Nose: Nose normal       Mouth/Throat:      Mouth: Mucous membranes are moist       Pharynx: No oropharyngeal exudate or posterior oropharyngeal erythema  Eyes:      Extraocular Movements: Extraocular movements intact  Pupils: Pupils are equal, round, and reactive to light  Neck:      Comments: NO palpable lymph nodes  Cardiovascular:      Rate and Rhythm: Normal rate and regular rhythm  Heart sounds: Normal heart sounds  Pulmonary:      Breath sounds: Normal breath sounds  No wheezing  Neurological:      General: No focal deficit present  Mental Status: She is alert and oriented to person, place, and time     Psychiatric:         Mood and Affect: Mood normal          Behavior: Behavior normal

## 2022-05-04 NOTE — PATIENT INSTRUCTIONS
Patient was educated on sinus infection  Patient was placed on antibiotics  Patient was told to eat on antibiotics  IF symptoms persist follow up with PCP    Sinusitis   WHAT YOU NEED TO KNOW:   Sinusitis is inflammation or infection of your sinuses  Sinusitis is most often caused by a virus  Acute sinusitis may last up to 12 weeks  Chronic sinusitis lasts longer than 12 weeks  Recurrent sinusitis means you have 4 or more infections in 1 year  DISCHARGE INSTRUCTIONS:   Return to the emergency department if:   · You have trouble breathing or wheezing that is getting worse  · You have a stiff neck, a fever, or a bad headache  · You cannot open your eye  · Your eyeball bulges out or you cannot move your eye  · You are more sleepy than normal, or you notice changes in your ability to think, move, or talk  · You have swelling of your forehead or scalp  Call your doctor if:   · You have vision changes, such as double vision  · Your eye and eyelid are red, swollen, and painful  · Your symptoms do not improve or go away after 10 days  · You have nausea and are vomiting  · Your nose is bleeding  · You have questions or concerns about your condition or care  Medicines: Your symptoms may go away on their own  Your healthcare provider may recommend watchful waiting for up to 10 days before starting antibiotics  You may need any of the following:  · Acetaminophen  decreases pain and fever  It is available without a doctor's order  Ask how much to take and how often to take it  Follow directions  Read the labels of all other medicines you are using to see if they also contain acetaminophen, or ask your doctor or pharmacist  Acetaminophen can cause liver damage if not taken correctly  Do not use more than 4 grams (4,000 milligrams) total of acetaminophen in one day  · NSAIDs , such as ibuprofen, help decrease swelling, pain, and fever   This medicine is available with or without a doctor's order  NSAIDs can cause stomach bleeding or kidney problems in certain people  If you take blood thinner medicine, always ask your healthcare provider if NSAIDs are safe for you  Always read the medicine label and follow directions  · Nasal steroid sprays  may help decrease inflammation in your nose and sinuses  · Decongestants  help reduce swelling and drain mucus in the nose and sinuses  They may help you breathe easier  · Antihistamines  help dry mucus in the nose and relieve sneezing  · Antibiotics  help treat or prevent a bacterial infection  · Take your medicine as directed  Contact your healthcare provider if you think your medicine is not helping or if you have side effects  Tell him or her if you are allergic to any medicine  Keep a list of the medicines, vitamins, and herbs you take  Include the amounts, and when and why you take them  Bring the list or the pill bottles to follow-up visits  Carry your medicine list with you in case of an emergency  Self-care:   · Rinse your sinuses as directed  Use a sinus rinse device to rinse your nasal passages with a saline (salt water) solution or distilled water  Do not use tap water  This will help thin the mucus in your nose and rinse away pollen and dirt  It will also help reduce swelling so you can breathe normally  · Use a humidifier  to increase air moisture in your home  This may make it easier for you to breathe and help decrease your cough  · Sleep with your head elevated  Place an extra pillow under your head before you go to sleep to help your sinuses drain  · Drink liquids as directed  Ask your healthcare provider how much liquid to drink each day and which liquids are best for you  Liquids will thin the mucus in your nose and help it drain  Avoid drinks that contain alcohol or caffeine  · Do not smoke, and avoid secondhand smoke    Nicotine and other chemicals in cigarettes and cigars can make your symptoms worse  Ask your healthcare provider for information if you currently smoke and need help to quit  E-cigarettes or smokeless tobacco still contain nicotine  Talk to your healthcare provider before you use these products  Prevent the spread of germs:   · Wash your hands often with soap and water  Wash your hands after you use the bathroom, change a child's diaper, or sneeze  Wash your hands before you prepare or eat food  · Stay away from people who are sick  Some germs spread easily and quickly through contact  Follow up with your doctor as directed: You may be referred to an ear, nose, and throat specialist  Write down your questions so you remember to ask them during your visits  © Copyright [employer] 2022 Information is for End User's use only and may not be sold, redistributed or otherwise used for commercial purposes  All illustrations and images included in CareNotes® are the copyrighted property of [employer] M [employer] Inc  or [employer]  The above information is an  only  It is not intended as medical advice for individual conditions or treatments  Talk to your doctor, nurse or pharmacist before following any medical regimen to see if it is safe and effective for you

## 2022-05-24 NOTE — PROGRESS NOTES
Oriana Shultz will be discharged from outpatient physical therapy care due to expiration of plan of care  Last attended tx session was on 3/3 ;did not return to therapy after this date  No objective measures updated, [facility] is not present at time of discharge

## 2022-06-11 ENCOUNTER — OFFICE VISIT (OUTPATIENT)
Dept: URGENT CARE | Facility: MEDICAL CENTER | Age: 37
End: 2022-06-11
Payer: COMMERCIAL

## 2022-06-11 VITALS
HEIGHT: 64 IN | DIASTOLIC BLOOD PRESSURE: 75 MMHG | BODY MASS INDEX: 20.83 KG/M2 | TEMPERATURE: 98.5 F | SYSTOLIC BLOOD PRESSURE: 120 MMHG | RESPIRATION RATE: 20 BRPM | OXYGEN SATURATION: 97 % | HEART RATE: 96 BPM | WEIGHT: 122 LBS

## 2022-06-11 DIAGNOSIS — H66.91 RIGHT OTITIS MEDIA, UNSPECIFIED OTITIS MEDIA TYPE: Primary | ICD-10-CM

## 2022-06-11 PROCEDURE — 99213 OFFICE O/P EST LOW 20 MIN: CPT | Performed by: PHYSICIAN ASSISTANT

## 2022-06-11 PROCEDURE — S9088 SERVICES PROVIDED IN URGENT: HCPCS | Performed by: PHYSICIAN ASSISTANT

## 2022-06-11 RX ORDER — AMOXICILLIN 875 MG/1
875 TABLET, COATED ORAL 2 TIMES DAILY
Qty: 20 TABLET | Refills: 0 | Status: SHIPPED | OUTPATIENT
Start: 2022-06-11 | End: 2022-06-21

## 2022-06-11 NOTE — PATIENT INSTRUCTIONS
Patient was educated on right ear infection  Patient was educated on antibiotics  Patient was told to eat on antibiotics  Patient was told if symptoms persist follow up with PCP or ENT  Ear Infection   WHAT YOU NEED TO KNOW:   An ear infection is also called otitis media  Blocked or swollen eustachian tubes can cause an infection  Eustachian tubes connect the middle ear to the back of the nose and throat  They drain fluid from the middle ear  You may have a buildup of fluid in your ear  Germs build up in the fluid and infection develops  DISCHARGE INSTRUCTIONS:   Return to the emergency department if:   You have clear fluid coming from your ear  You have a stiff neck, headache, and a fever  Call your doctor if:   You see blood or pus draining from your ear  Your ear pain gets worse or does not go away, even after treatment  The outside of your ear is red or swollen  You are vomiting or have diarrhea  You have questions or concerns about your condition or care  Medicines: You may  need any of the following:  Acetaminophen  decreases pain and fever  It is available without a doctor's order  Ask how much to take and how often to take it  Follow directions  Read the labels of all other medicines you are using to see if they also contain acetaminophen, or ask your doctor or pharmacist  Acetaminophen can cause liver damage if not taken correctly  Do not use more than 4 grams (4,000 milligrams) total of acetaminophen in one day  NSAIDs , such as ibuprofen, help decrease swelling, pain, and fever  This medicine is available with or without a doctor's order  NSAIDs can cause stomach bleeding or kidney problems in certain people  If you take blood thinner medicine, always ask your healthcare provider if NSAIDs are safe for you  Always read the medicine label and follow directions  Ear drops  may contain medicine to decrease pain and inflammation      Antibiotics  help treat a bacterial infection  Take your medicine as directed  Contact your healthcare provider if you think your medicine is not helping or if you have side effects  Tell him or her if you are allergic to any medicine  Keep a list of the medicines, vitamins, and herbs you take  Include the amounts, and when and why you take them  Bring the list or the pill bottles to follow-up visits  Carry your medicine list with you in case of an emergency  Self-care:   Apply heat  on your ear for 15 to 20 minutes, 3 to 4 times a day or as directed  You can apply heat with an electric heating pad, hot water bottle, or warm compress  Always put a cloth between your skin and the heat pack to prevent burns  Heat helps decrease pain  Apply ice  on your ear for 15 to 20 minutes, 3 to 4 times a day for 2 days or as directed  Use an ice pack, or put crushed ice in a plastic bag  Cover it with a towel before you apply it to your ear  Ice decreases swelling and pain  Prevent an ear infection:   Wash your hands often  to help prevent the spread of germs  Ask everyone in your house to wash their hands with soap and water  Ask them to wash after they use the bathroom or change a diaper  Remind them to wash before they prepare or eat food  Stay away from people who are ill  Some germs spread easily and quickly through contact  Follow up with your doctor as directed:  Write down your questions so you remember to ask them during your visits  © Copyright [employer] 2022 Information is for End User's use only and may not be sold, redistributed or otherwise used for commercial purposes  All illustrations and images included in CareNotes® are the copyrighted property of A D A M [employer] Inc  or Fabricio Santana  The above information is an  only  It is not intended as medical advice for individual conditions or treatments   Talk to your doctor, nurse or pharmacist before following any medical regimen to see if it is safe and effective for you

## 2022-06-11 NOTE — PROGRESS NOTES
[facility]        NAME: Krzysztof Muñiz is a 30 y o  female  : 1985    MRN: [id number]  DATE: 2022  TIME: 2:09 PM    Assessment and Plan   Right otitis media, unspecified otitis media type [H66 91]  1  Right otitis media, unspecified otitis media type  amoxicillin (AMOXIL) 875 mg tablet         Patient Instructions     Patient was educated on right ear infection  Patient was educated on antibiotics  Patient was told to eat on antibiotics  Patient was told if symptoms persist follow up with PCP or ENT  Chief Complaint     Chief Complaint   Patient presents with    Earache     Patient states she has been having having pain in her R ear 2 weeks  She has tonsil stones and her glands are swollen         History of Present Illness       Patient is here today complaining of right ear pain for two weeks  Patient reports she was treated two weeks ago for a sinus infection  Patient admits history of Tonsil stones that she is seeing ENT On 2022  Review of Systems   Review of Systems   Constitutional: Negative  HENT: Positive for ear pain and sore throat  Respiratory: Negative  Cardiovascular: Negative  Neurological: Negative  Psychiatric/Behavioral: Negative            Current Medications       Current Outpatient Medications:     amoxicillin (AMOXIL) 875 mg tablet, Take 1 tablet (875 mg total) by mouth 2 (two) times a day for 10 days, Disp: 20 tablet, Rfl: 0    acetaminophen (TYLENOL) 500 mg tablet, Take 500 mg by mouth every 6 (six) hours as needed, Disp: , Rfl:     acetaminophen-codeine (TYLENOL/CODEINE #3) 300-30 MG per tablet, Take 1 tablet by mouth as needed, Disp: , Rfl:     B Complex Vitamins (B COMPLEX 100 PO), Take 1 tablet by mouth daily, Disp: , Rfl:     cetirizine (ZyrTEC) 10 mg tablet, Take 10 mg by mouth daily, Disp: , Rfl:     citalopram (CeleXA) 20 mg tablet, Take 20 mg by mouth daily, Disp: , Rfl:     co-enzyme Q-10 30 MG capsule, Take 30 mg by mouth Three times a day, Disp: , Rfl:     Cromolyn Sodium (NASAL ALLERGY NA), into each nostril daily as needed , Disp: , Rfl:     dicyclomine (BENTYL) 10 mg capsule, Take 10 mg by mouth, Disp: , Rfl:     diphenhydrAMINE (BENADRYL) 25 mg capsule, Take 25 mg by mouth every 6 (six) hours as needed, Disp: , Rfl:     fexofenadine (ALLEGRA) 180 MG tablet, Take 180 mg by mouth daily, Disp: , Rfl:     ibuprofen (MOTRIN) 200 mg tablet, Take 200 mg by mouth every 6 (six) hours as needed, Disp: , Rfl:     meclizine (ANTIVERT) 25 mg tablet, Take 1 tablet (25 mg total) by mouth 3 (three) times a day as needed for dizziness, Disp: 30 tablet, Rfl: 0    ondansetron (Zofran ODT) 4 mg disintegrating tablet, Take 1 tablet (4 mg total) by mouth every 6 (six) hours as needed for nausea or vomiting, Disp: 20 tablet, Rfl: 0    phenylephrine (SUDAFED PE) 10 MG TABS, Take 10 mg by mouth every 4 (four) hours as needed for congestion, Disp: , Rfl:     Specialty Vitamins Products (magnesium, amino acid chelate,) 133 MG tablet, Take 250 mg by mouth daily, Disp: , Rfl:     SUMAtriptan (IMITREX) 100 mg tablet, Take 1 tab PO, at the onset of Migraine  May repeat in 1 hour   Limit 2 tabs per day, 2 days per week, Disp: , Rfl:     Current Allergies     Allergies as of 06/11/2022 - Reviewed 06/11/2022   Allergen Reaction Noted    Dust mite extract Sneezing 01/04/2021    Phenothiazines Other (See Comments) 09/13/2007    Thorazine [chlorpromazine] Other (See Comments) 06/13/2017            The following portions of the patient's history were reviewed and updated as appropriate: allergies, current medications, past family history, past medical history, past social history, past surgical history and problem list      Past Medical History:   Diagnosis Date    Anxiety     Environmental and seasonal allergies     Migraine     Palpitations        Past Surgical History:   Procedure Laterality Date    BREAST SURGERY      WISDOM TOOTH EXTRACTION         History reviewed  No pertinent family history  Medications have been verified  Objective   /75   Pulse 96   Temp 98 5 °F (36 9 °C)   Resp 20   Ht 5' 4" (1 626 m)   Wt 55 3 kg (122 lb)   LMP 06/01/2022   SpO2 97%   BMI 20 94 kg/m²   Patient's last menstrual period was 06/01/2022  Physical Exam     Physical Exam  Vitals and nursing note reviewed  Constitutional:       Appearance: She is normal weight  HENT:      Head: Normocephalic  Right Ear: Ear canal and external ear normal       Left Ear: Tympanic membrane, ear canal and external ear normal       Ears:      Comments: Right TM is mildly red and bulging     Nose: Nose normal       Mouth/Throat:      Mouth: Mucous membranes are moist       Pharynx: Posterior oropharyngeal erythema present  Cardiovascular:      Rate and Rhythm: Normal rate and regular rhythm  Heart sounds: Normal heart sounds  Pulmonary:      Breath sounds: Normal breath sounds  Neurological:      General: No focal deficit present  Mental Status: She is alert and oriented to person, place, and time     Psychiatric:         Mood and Affect: Mood normal          Behavior: Behavior normal

## 2022-06-12 ENCOUNTER — AMB VIDEO VISIT (OUTPATIENT)
Dept: OTHER | Facility: HOSPITAL | Age: 37
End: 2022-06-12

## 2022-06-12 VITALS — TEMPERATURE: 97.4 F

## 2022-06-12 DIAGNOSIS — S16.1XXA NECK MUSCLE STRAIN, INITIAL ENCOUNTER: Primary | ICD-10-CM

## 2022-06-12 PROCEDURE — ECARE PR SL URGENT CARE VIRTUAL VISIT: Performed by: NURSE PRACTITIONER

## 2022-06-12 NOTE — PATIENT INSTRUCTIONS
As we dicussed pain seems to be muscle in nature, could possible be the way you slept last night  Warm compresses as dicussed,  Advil for pain  Discussed muscle relaxer but you declined  Continue antibiotic for ear infections  Monitor for worsening symptoms  IF you develop fever, headache, stiff neck, worsening ear pain go to ER  Follow up with PCP if no improvement

## 2022-06-12 NOTE — PROGRESS NOTES
Video Visit - Miles [place] 37 y o  female MRN: [id number]    REQUIRED DOCUMENTATION:         1  This service was provided via [facility]  2  Provider located at [address] [place] [id number]  3  [facility] provider: LISA Gotti  4  Identify all parties in room with patient during [facility] visit:  patient   5  After connecting through [employer], patient was identified by name and date of birth  Patient was then informed that this was a Telemedicine visit and that the exam was being conducted confidentially over secure lines  My office door was closed  No one else was in the room  Patient acknowledged consent and understanding of privacy and security of the Telemedicine visit  I informed the patient that I have reviewed their record in Epic and presented the opportunity for them to ask any questions regarding the visit today  The patient agreed to participate  30year old female here today for video visit  She was seen yesterday at urgent care and started antibiotic for right ear infection  She was started on Amoxicillin  She started antibiotic at 5 pm   She woke up today with right sided neck pain  She felt like she was going to get migraine and took her migraine medication magnesium, benadryl, advil  She drank water  She did not get migraine  No fever  She had some slight nasuea  Pain with In the neck is with movements  No photosensitivity  Review of Systems   Constitutional: Negative for activity change, chills, fatigue and fever  HENT: Positive for ear pain  Respiratory: Negative  Musculoskeletal: Positive for neck pain and neck stiffness  Neurological: Negative for dizziness, syncope, weakness and numbness  Psychiatric/Behavioral: Negative  Vitals:    06/12/22 1055   Temp: (!) 97 4 °F (36 3 °C)       Physical Exam  Constitutional:       General: She is not in acute distress  Appearance: Normal appearance   She is not ill-appearing or toxic-appearing  Comments: Appears well and nontoxic    HENT:      Head: Normocephalic and atraumatic  Eyes:      Conjunctiva/sclera: Conjunctivae normal    Neck:        Comments: Assessment is done through self exam as this is video visit   Pulmonary:      Effort: Pulmonary effort is normal  No respiratory distress  Musculoskeletal:      Cervical back: No erythema or rigidity  Muscular tenderness present  No spinous process tenderness  Decreased range of motion  Comments: Neck: pain with rom of the neck  Lymphadenopathy:      Cervical:      Right cervical: No superficial, deep or posterior cervical adenopathy  Skin:     Comments: No rash on face or neck  Patient denies rash anywhere else    Neurological:      Mental Status: She is alert and oriented to person, place, and time  Psychiatric:         Mood and Affect: Mood normal          Behavior: Behavior normal          Thought Content: Thought content normal          Judgment: Judgment normal        Diagnoses and all orders for this visit:    Neck muscle strain, initial encounter      Patient Instructions   As we dicussed pain seems to be muscle in nature, could possible be the way you slept last night  Warm compresses as dicussed,  Advil for pain  Discussed muscle relaxer but you declined  Continue antibiotic for ear infections  Monitor for worsening symptoms  IF you develop fever, headache, stiff neck, worsening ear pain go to ER  Follow up with PCP if no improvement  Follow up with PCP if not improved, if symptoms are worse, go to the ER

## 2022-06-12 NOTE — CARE ANYWHERE EVISITS
Visit Summary for BrittanyN   Spring - Gender: Female - Date of Birth: [id number]  Date: [id number] - Duration: 10 minutes  Patient: Nick Muñiz  Provider: Aaron GONZALEZ    Patient Contact Information  Address  [facility],[address]; [address] [facility]  [phone]    Visit Topics    Triage Questions   What is your current physical address in the event of a medical emergency? Answer []  Are you allergic to any medications? Answer []  Are you now or could you be pregnant? Answer []  Do you have any immune system compromise or chronic lung   disease? Answer []  Do you have any vulnerable family members in the home (infant, pregnant, cancer, elderly)? Answer []     Conversation Transcripts  [0A][0A] [Notification] You are connected with Shannan Charles, Urgent Care Specialist [0A][Notification] [facility] is located in [place]  [0A][Notification] [facility] has shared health history  Deep Ibrahim  [0A]    Diagnosis  Strain of muscle, fascia and tendon at neck level, init    Procedures  Value: [id number] Code: CPT-4 UNLISTED E&M SERVICE    Medications Prescribed    No prescriptions ordered    Electronically signed by: Shannan Teague(NPI [id number])

## 2022-09-20 NOTE — H&P (VIEW-ONLY)
Assessment/Plan:    Based upon the history given and current physical findings, Dr Corrie Epps has recommended that the patient undergo a tonsillectomy  All risks, benefits, alternatives, and complications of the procedure have been reviewed in detail  The risks of the surgery include but are not limited to: bleeding, infection, voice change, recurrent sore throat, swallowing difficulty, and the need for further surgery  The patient / parent understands and accept all risks of the surgery  Pre-operative lab tests have been ordered  Post operative instructions were reivewed and given to the patient / parent  Post operative medication was given and the patient / parent was  instructed to fill the medication in preparation for the surgery  A  post-operative appointment has been made for the patient  If any questions should arise prior to or after the surgery, the patient / parent should call my office and I will be glad to discuss any questions or concerns with them  Encounter Diagnosis     ICD-10-CM    1  Other chronic diseases of tonsils and adenoids  J35 8               Patient ID: Yoseph Muñiz is a 30 y o  female  [facility] presents for routine preoperative evaluation for upcoming tonsillectomy surgery on October 4, 2022 with Dr Corrie Epps  She denies any change in medical history  Prior hpi:  [facility] is here for evaluation of chronic tonsillith formation  This is extremely bothersome for her has been occurring for many years  Symptoms include:  Sore throat, pain from the stones, pressure in the right ear and neck  In preparation for this visit all prior office notes, prior consultations, emergency room visits, blood work results, and imaging studies were personally reviewed  A total of 5 minutes was spent reviewing all of this information          The following portions of the patient's history were reviewed and updated as appropriate: allergies, current medications, past family history, past medical history, past social history, past surgical history and problem list       Past Medical History:   Diagnosis Date    Anxiety     House dust mite allergy     Migraine     Palpitations        Past Surgical History:   Procedure Laterality Date    BREAST BIOPSY Bilateral     benign on each side    WISDOM TOOTH EXTRACTION         Social History     Tobacco Use    Smoking status: Never Smoker    Smokeless tobacco: Never Used   Vaping Use    Vaping Use: Never used   Substance Use Topics    Alcohol use: Not Currently     Comment: socially    Drug use: Yes     Types: Marijuana       Current Outpatient Medications on File Prior to Visit   Medication Sig Dispense Refill    acetaminophen (TYLENOL) 500 mg tablet Take 500 mg by mouth every 6 (six) hours as needed      B Complex Vitamins (B COMPLEX 100 PO) Take 1 tablet by mouth daily      cetirizine (ZyrTEC) 10 mg tablet Take 10 mg by mouth daily      citalopram (CeleXA) 20 mg tablet Take 20 mg by mouth daily      co-enzyme Q-10 30 MG capsule Take 30 mg by mouth Three times a day      Cromolyn Sodium (NASAL ALLERGY NA) into each nostril daily as needed       dicyclomine (BENTYL) 10 mg capsule Take 10 mg by mouth      diphenhydrAMINE (BENADRYL) 25 mg capsule Take 25 mg by mouth every 6 (six) hours as needed      fexofenadine (ALLEGRA) 180 MG tablet Take 180 mg by mouth daily      ibuprofen (MOTRIN) 200 mg tablet Take 200 mg by mouth every 6 (six) hours as needed      meclizine (ANTIVERT) 25 mg tablet Take 1 tablet (25 mg total) by mouth 3 (three) times a day as needed for dizziness 30 tablet 0    ondansetron (Zofran ODT) 4 mg disintegrating tablet Take 1 tablet (4 mg total) by mouth every 6 (six) hours as needed for nausea or vomiting 20 tablet 0    oxyCODONE-acetaminophen (PERCOCET) 5-325 mg per tablet Take 1 tablet by mouth every 6 (six) hours as needed for moderate pain Max Daily Amount: 4 tablets 20 tablet 0    phenylephrine (SUDAFED PE) 10 MG TABS Take 10 mg by mouth every 4 (four) hours as needed for congestion      predniSONE 20 mg tablet 3 tabs day 1-3, 2 tabs day 4-6, 1 tab day 7-9 18 tablet 0    Specialty Vitamins Products (magnesium, amino acid chelate,) 133 MG tablet Take 250 mg by mouth daily      SUMAtriptan (IMITREX) 100 mg tablet Take 1 tab PO, at the onset of Migraine  May repeat in 1 hour  Limit 2 tabs per day, 2 days per week      acetaminophen-codeine (TYLENOL with CODEINE #3) 300-30 MG per tablet Take 1 tablet by mouth as needed (Patient not taking: Reported on 9/20/2022)       No current facility-administered medications on file prior to visit  Allergies   Allergen Reactions    Dust Mite Extract Sneezing    Phenothiazines Other (See Comments)     Lowers bp    Thorazine [Chlorpromazine] Other (See Comments)     Drops blood pressure           Review of Systems   Constitutional: Negative for activity change, appetite change, fatigue, fever and unexpected weight change  HENT: Negative for congestion, ear discharge, ear pain, hearing loss, nosebleeds, postnasal drip, rhinorrhea, sinus pressure, sinus pain, sneezing, sore throat, tinnitus, trouble swallowing and voice change  Eyes: Negative  Negative for photophobia, pain, itching and visual disturbance  Respiratory: Negative  Negative for cough, chest tightness and shortness of breath  Cardiovascular: Negative  Negative for chest pain  Gastrointestinal: Negative  Negative for abdominal pain  Endocrine: Negative  Musculoskeletal: Negative  Negative for gait problem, neck pain and neck stiffness  Skin: Negative  Allergic/Immunologic: Negative  Negative for environmental allergies  Neurological: Negative  Negative for dizziness, speech difficulty, light-headedness and headaches  Hematological: Negative  Negative for adenopathy  Psychiatric/Behavioral: Negative  Negative for sleep disturbance  The patient is not nervous/anxious  BP (!) 118/20   Pulse 81   Ht 5' 4" (1 626 m)   Wt 55 8 kg (123 lb)   BMI 21 11 kg/m²       PHYSICAL  EXAMINATION    CONSTITUTION:    Appears appropriate for age  No evidence of any acute distress  Communicates normally  Voice quality is clear  Alert and oriented  HEAD/FACE:    Atraumatic, normocephalic on inspection  No scars present  Salivary glands are normal in texture and size without any asymmetry  Facial nerve function is symmetric and normal     EYES:    Extraocular muscles intact in both eyes, normal gaze bilaterally and no evidence of nystagmus  Pupils equal, round, and accommodate to light bilaterally  EARS:    External ears normal     External canals are clear and dry  Narrow laterally  Tympanic membranes intact with normal mobility, no effusion, no retraction, no perforation  Post auricular area is normal    NOSE:    External nose without deformity  Internal mucosa pink and moist     Septum midline  Inferior nasal turbinates normal in color and size bilaterally    ORAL CAVITY:    Lips normal and healthy in appearance  Dentition normal     Gums healthy, pink and moist     Tongue appears pink and moist with no lesions  Floor of mouth pink, moist, and smooth  Submandibular ducts patent with clear saliva  Parotid ducts patent with clear saliva  Oral mucosa pink and moist     Hard palate normal in appearance without any lesions  OROPHARYNX:    Soft palate pink and moist without any lesions  Uvula midline without any lesions  Tonsils grade 1 and cryptic bilaterally  Posterior pharynx pink and moist without any lesions - some studding in the gutters bilaterally    NECK:    Supple and symmetric  No masses noted  Trachea midline  No thyromegaly or nodules noted  LYMPH:    No palpable adenopathy in left or right neck    SKIN:    No rashes  No lesions noted       HEART:   Regular rate and rhythm    LUNGS:  Clear to auscultation bilaterally

## 2022-09-27 DIAGNOSIS — J03.01 ACUTE RECURRENT STREPTOCOCCAL TONSILLITIS: Primary | ICD-10-CM

## 2022-09-27 RX ORDER — AZITHROMYCIN 500 MG/1
500 TABLET, FILM COATED ORAL DAILY
Qty: 3 TABLET | Refills: 0 | Status: SHIPPED | OUTPATIENT
Start: 2022-09-27 | End: 2022-09-30

## 2022-09-29 ENCOUNTER — ANESTHESIA EVENT (OUTPATIENT)
Dept: PERIOP | Facility: HOSPITAL | Age: 37
End: 2022-09-29
Payer: COMMERCIAL

## 2022-09-29 NOTE — PRE-PROCEDURE INSTRUCTIONS
Pre-Surgery Instructions:   Medication Instructions    acetaminophen (TYLENOL) 500 mg tablet Uses PRN- OK to take day of surgery    B Complex Vitamins (B COMPLEX 100 PO) Stop taking    co-enzyme Q-10 30 MG capsule Stop taking    Cromolyn Sodium (NASAL ALLERGY NA) Stop taking    diphenhydrAMINE (BENADRYL) 25 mg capsule Uses PRN- OK to take day of surgery    fexofenadine (ALLEGRA) 180 MG tablet Uses PRN- OK to take day of surgery    ibuprofen (MOTRIN) 200 mg tablet Stop taking    meclizine (ANTIVERT) 25 mg tablet Uses PRN- OK to take day of surgery    ondansetron (Zofran ODT) 4 mg disintegrating tablet Uses PRN- OK to take day of surgery    Specialty Vitamins Products (magnesium, amino acid chelate,) 133 MG tablet Stop taking    SUMAtriptan (IMITREX) 100 mg tablet Uses PRN- OK to take day of surgery   Have you had / have a sore throat? No  Have you had / have a cough less than 1 week? No  Have you had / have a fever greater than 100 0 - 100  4? No  Are you experiencing any shortness of breath? No    Review with patient via phone medications and showering instructions  Instructed to avoid all ASA and OTC Vit/Supp 1 week prior to surgery and to avoid NSAIDs 3 days prior to surgery per anesthesia instructions  Tylenol ok to take prn  [facility] call with surgery schedule time, nothing eat or drink after midnight  Verbalized understanding

## 2022-10-04 ENCOUNTER — ANESTHESIA (OUTPATIENT)
Dept: PERIOP | Facility: HOSPITAL | Age: 37
End: 2022-10-04
Payer: COMMERCIAL

## 2022-10-04 ENCOUNTER — HOSPITAL ENCOUNTER (OUTPATIENT)
Facility: HOSPITAL | Age: 37
Setting detail: OUTPATIENT SURGERY
Discharge: HOME | End: 2022-10-04
Attending: OTOLARYNGOLOGY | Admitting: OTOLARYNGOLOGY
Payer: COMMERCIAL

## 2022-10-04 VITALS
TEMPERATURE: 97.7 F | BODY MASS INDEX: 21 KG/M2 | HEIGHT: 64 IN | WEIGHT: 123 LBS | DIASTOLIC BLOOD PRESSURE: 64 MMHG | RESPIRATION RATE: 18 BRPM | SYSTOLIC BLOOD PRESSURE: 110 MMHG | OXYGEN SATURATION: 97 % | HEART RATE: 82 BPM

## 2022-10-04 DIAGNOSIS — J35.8 OTHER CHRONIC DISEASES OF TONSILS AND ADENOIDS: ICD-10-CM

## 2022-10-04 LAB
EXT PREGNANCY TEST URINE: NEGATIVE
EXT. CONTROL: NORMAL

## 2022-10-04 PROCEDURE — 81025 URINE PREGNANCY TEST: CPT | Performed by: OTOLARYNGOLOGY

## 2022-10-04 PROCEDURE — 42826 REMOVAL OF TONSILS: CPT | Performed by: OTOLARYNGOLOGY

## 2022-10-04 PROCEDURE — 88304 TISSUE EXAM BY PATHOLOGIST: CPT | Performed by: PATHOLOGY

## 2022-10-04 RX ORDER — OXYCODONE HYDROCHLORIDE AND ACETAMINOPHEN 5; 325 MG/1; MG/1
TABLET ORAL
Status: COMPLETED
Start: 2022-10-04 | End: 2022-10-04

## 2022-10-04 RX ORDER — DEXMEDETOMIDINE HYDROCHLORIDE 100 UG/ML
INJECTION, SOLUTION INTRAVENOUS AS NEEDED
Status: DISCONTINUED | OUTPATIENT
Start: 2022-10-04 | End: 2022-10-04

## 2022-10-04 RX ORDER — ACETAMINOPHEN 325 MG/1
650 TABLET ORAL EVERY 4 HOURS PRN
Status: DISCONTINUED | OUTPATIENT
Start: 2022-10-04 | End: 2022-10-05 | Stop reason: HOSPADM

## 2022-10-04 RX ORDER — ONDANSETRON 2 MG/ML
4 INJECTION INTRAMUSCULAR; INTRAVENOUS ONCE AS NEEDED
Status: DISCONTINUED | OUTPATIENT
Start: 2022-10-04 | End: 2022-10-04 | Stop reason: HOSPADM

## 2022-10-04 RX ORDER — METOCLOPRAMIDE HYDROCHLORIDE 5 MG/ML
10 INJECTION INTRAMUSCULAR; INTRAVENOUS ONCE AS NEEDED
Status: DISCONTINUED | OUTPATIENT
Start: 2022-10-04 | End: 2022-10-04 | Stop reason: HOSPADM

## 2022-10-04 RX ORDER — PROPOFOL 10 MG/ML
INJECTION, EMULSION INTRAVENOUS AS NEEDED
Status: DISCONTINUED | OUTPATIENT
Start: 2022-10-04 | End: 2022-10-04

## 2022-10-04 RX ORDER — ONDANSETRON 2 MG/ML
INJECTION INTRAMUSCULAR; INTRAVENOUS AS NEEDED
Status: DISCONTINUED | OUTPATIENT
Start: 2022-10-04 | End: 2022-10-04

## 2022-10-04 RX ORDER — FENTANYL CITRATE 50 UG/ML
INJECTION, SOLUTION INTRAMUSCULAR; INTRAVENOUS AS NEEDED
Status: DISCONTINUED | OUTPATIENT
Start: 2022-10-04 | End: 2022-10-04

## 2022-10-04 RX ORDER — MIDAZOLAM HYDROCHLORIDE 2 MG/2ML
INJECTION, SOLUTION INTRAMUSCULAR; INTRAVENOUS AS NEEDED
Status: DISCONTINUED | OUTPATIENT
Start: 2022-10-04 | End: 2022-10-04

## 2022-10-04 RX ORDER — DEXAMETHASONE SODIUM PHOSPHATE 10 MG/ML
INJECTION, SOLUTION INTRAMUSCULAR; INTRAVENOUS AS NEEDED
Status: DISCONTINUED | OUTPATIENT
Start: 2022-10-04 | End: 2022-10-04

## 2022-10-04 RX ORDER — FENTANYL CITRATE 50 UG/ML
INJECTION, SOLUTION INTRAMUSCULAR; INTRAVENOUS
Status: COMPLETED
Start: 2022-10-04 | End: 2022-10-04

## 2022-10-04 RX ORDER — MAGNESIUM HYDROXIDE 1200 MG/15ML
LIQUID ORAL AS NEEDED
Status: DISCONTINUED | OUTPATIENT
Start: 2022-10-04 | End: 2022-10-04 | Stop reason: HOSPADM

## 2022-10-04 RX ORDER — LIDOCAINE HYDROCHLORIDE 20 MG/ML
INJECTION, SOLUTION EPIDURAL; INFILTRATION; INTRACAUDAL; PERINEURAL AS NEEDED
Status: DISCONTINUED | OUTPATIENT
Start: 2022-10-04 | End: 2022-10-04

## 2022-10-04 RX ORDER — SUCCINYLCHOLINE/SOD CL,ISO/PF 100 MG/5ML
SYRINGE (ML) INTRAVENOUS AS NEEDED
Status: DISCONTINUED | OUTPATIENT
Start: 2022-10-04 | End: 2022-10-04

## 2022-10-04 RX ORDER — FENTANYL CITRATE/PF 50 MCG/ML
25 SYRINGE (ML) INJECTION
Status: DISCONTINUED | OUTPATIENT
Start: 2022-10-04 | End: 2022-10-04 | Stop reason: HOSPADM

## 2022-10-04 RX ORDER — OXYCODONE HYDROCHLORIDE AND ACETAMINOPHEN 5; 325 MG/1; MG/1
2 TABLET ORAL EVERY 4 HOURS PRN
Status: DISCONTINUED | OUTPATIENT
Start: 2022-10-04 | End: 2022-10-05 | Stop reason: HOSPADM

## 2022-10-04 RX ORDER — SODIUM CHLORIDE, SODIUM LACTATE, POTASSIUM CHLORIDE, CALCIUM CHLORIDE 600; 310; 30; 20 MG/100ML; MG/100ML; MG/100ML; MG/100ML
100 INJECTION, SOLUTION INTRAVENOUS CONTINUOUS
Status: DISCONTINUED | OUTPATIENT
Start: 2022-10-04 | End: 2022-10-05 | Stop reason: HOSPADM

## 2022-10-04 RX ORDER — ONDANSETRON 2 MG/ML
4 INJECTION INTRAMUSCULAR; INTRAVENOUS EVERY 6 HOURS PRN
Status: DISCONTINUED | OUTPATIENT
Start: 2022-10-04 | End: 2022-10-05 | Stop reason: HOSPADM

## 2022-10-04 RX ORDER — PROPOFOL 10 MG/ML
INJECTION, EMULSION INTRAVENOUS CONTINUOUS PRN
Status: DISCONTINUED | OUTPATIENT
Start: 2022-10-04 | End: 2022-10-04

## 2022-10-04 RX ORDER — DEXTROSE AND SODIUM CHLORIDE 5; .9 G/100ML; G/100ML
125 INJECTION, SOLUTION INTRAVENOUS CONTINUOUS
Status: DISCONTINUED | OUTPATIENT
Start: 2022-10-04 | End: 2022-10-05 | Stop reason: HOSPADM

## 2022-10-04 RX ADMIN — LIDOCAINE HYDROCHLORIDE 100 MG: 20 INJECTION, SOLUTION EPIDURAL; INFILTRATION; INTRACAUDAL; PERINEURAL at 10:02

## 2022-10-04 RX ADMIN — DEXAMETHASONE SODIUM PHOSPHATE 10 MG: 10 INJECTION, SOLUTION INTRAMUSCULAR; INTRAVENOUS at 10:02

## 2022-10-04 RX ADMIN — FENTANYL CITRATE 25 MCG: 50 INJECTION INTRAMUSCULAR; INTRAVENOUS at 10:56

## 2022-10-04 RX ADMIN — DEXMEDETOMIDINE HCL 8 MCG: 100 INJECTION INTRAVENOUS at 10:09

## 2022-10-04 RX ADMIN — FENTANYL CITRATE 50 MCG: 50 INJECTION, SOLUTION INTRAMUSCULAR; INTRAVENOUS at 10:02

## 2022-10-04 RX ADMIN — MIDAZOLAM 2 MG: 1 INJECTION INTRAMUSCULAR; INTRAVENOUS at 09:52

## 2022-10-04 RX ADMIN — ONDANSETRON 4 MG: 2 INJECTION INTRAMUSCULAR; INTRAVENOUS at 10:20

## 2022-10-04 RX ADMIN — OXYCODONE HYDROCHLORIDE AND ACETAMINOPHEN 1 TABLET: 5; 325 TABLET ORAL at 11:39

## 2022-10-04 RX ADMIN — SODIUM CHLORIDE, SODIUM LACTATE, POTASSIUM CHLORIDE, AND CALCIUM CHLORIDE 100 ML/HR: .6; .31; .03; .02 INJECTION, SOLUTION INTRAVENOUS at 09:02

## 2022-10-04 RX ADMIN — Medication 100 MG: at 10:02

## 2022-10-04 RX ADMIN — PROPOFOL 100 MCG/KG/MIN: 10 INJECTION, EMULSION INTRAVENOUS at 10:04

## 2022-10-04 RX ADMIN — PROPOFOL 200 MG: 10 INJECTION, EMULSION INTRAVENOUS at 10:02

## 2022-10-04 RX ADMIN — FENTANYL CITRATE 25 MCG: 50 INJECTION INTRAMUSCULAR; INTRAVENOUS at 11:03

## 2022-10-04 NOTE — OP NOTE
OPERATIVE REPORT  PATIENT NAME: Nir Muñiz    :  1985  MRN: [id number]  Pt Location: [facility] OR ROOM 02    SURGERY DATE: 10/4/2022    Surgeon(s) and Role:     * Roland Tapia DO - Primary    Preop Diagnosis:  Other chronic diseases of tonsils and adenoids [J35 8]    Post-Op Diagnosis Codes:     * Other chronic diseases of tonsils and adenoids [J35 8]    Procedure(s) (LRB):  TONSILLECTOMY (Bilateral)    Specimen(s):  ID Type Source Tests Collected by Time Destination   1 : BILATERAL TONSILS Tissue Tonsil TISSUE EXAM Jeanne Benderille,  10/4/2022 1012        Estimated Blood Loss:   Minimal    Drains:  * No LDAs found *    Anesthesia Type:   General    Operative Indications: Other chronic diseases of tonsils and adenoids [J35 8]      Operative Findings:  Cryptic tonsils with stones    Complications:   None    Procedure and Technique:  Patient was identified in the holding area and taken to the OR  Patient was placed on the OR table in supine position and placed under general anesthesia with an endotracheal tube  Table was turned 90 degrees and prepped and draped in usual fashion for the above procedure  Time out was obtained and all information correct and agreed upon by surgeon, OR staff and anesthesia  The oral cavity was opened using a McGyver mouthgag and held in place with hastings stand suspension  Evaluation of the oral cavity revealed that the left tonsil was grade 1 and the right tonsil was grade 1  Attention was first turned to the right tonsil  This was grasped with a curved Leena forceps and pulled medially  Using the coblation wand on the standard settings the tonsil was removed from the tonsillar fossa using the coblation setting  Any visible vessels which were seen just under the tissue were coagulated with the coagulation setting on the unit  The same procedure was then completed on the opposite side  At the completion of the tonsillectomy the fossae were dry    Soft suction catheter was then passed into the esophagus and stomach to remove any gastric contents and then removed  The mouthgag was let down and then reopened to evaluated the oropharyngeal area to make sure that there was no bleeding  Once confirmed, the mouthgag was removed and the bed turned back 90 degrees towards anesthesia  The patient was awoken, extubated and taken to the PACU in stable condition       I was present for the entire procedure    Patient Disposition:  PACU         SIGNATURE: Yajaira Contreras DO  DATE: October 4, 2022  TIME: 10:21 AM

## 2022-10-04 NOTE — ANESTHESIA POSTPROCEDURE EVALUATION
Post-Op Assessment Note    CV Status:  Stable  Pain Score: 0    Pain management: adequate     Mental Status:  Arousable   Hydration Status:  Stable   PONV Controlled:  None   Airway Patency:  Patent      Post Op Vitals Reviewed: Yes      Staff: CRNA         No complications documented      BP   93/66   Temp   97 2   Pulse  85   Resp   16   SpO2   99%

## 2022-10-04 NOTE — DISCHARGE INSTRUCTIONS
Kayla Lemus Spring  1985      [facility]      Postoperative tonsillectomy instructions    1  Force fluids as much as possible  This will promote healing and maintain adequate hydration  Certain fruit juices such as apple and apricot juice, soft drinks, and frozen drink bars are suggested  2   Do not drink through a straw  This may cause bleeding if there is contact with the surgical site  3   Milk or ice cream should be avoided for the first 24 hours due to increased mucus production  Soft foods like gelatin, ice cream, custard, puddings, and mashed foods are helpful to maintain adequate nutrition  Spicy, scratchy, or rough foods such as toast, crackers, and potato chips should be avoided since they may scratch the tonsil site and cause bleeding  4   No red colored food or liquid  5   A moderate amount of throat and ear discomfort is to be expected  6   Foul-smelling breath is normal and is not a sign of infection  7   You may see crusty white patches in your throat  This is a temporary normal covering during the healing period and is NOT a sign of infection  After the first week the white patches can be expected to come off and may cause slight bleeding  The best way to prevent buildup of too much crusting and bleeding is to keep the throat moist with lots of fluids  8   You should have lots of rest and limited activity at home until your first postoperative visit  No strenuous activities, bending, or lifting until approved by the surgeon  No travel out of your immediate area  9   If severe pain, bleeding, or fever greater than 101°F notify our office immediately at [phone] or [phone] or go immediately to the emergency room  10   If a prescription for pain medication was given follow appropriate directions on label  If no prescription was given you may take over the counter pain medication as needed      11   If a prescription for steroids (Prednisone, prednisolone) was given you may begin taking this medication the day following the surgical procedure  12  No smoking  Avoid second hand smoke exposure

## 2022-10-04 NOTE — NURSING NOTE
Pt ambulated to bathroom with assistance  Voided qs  Tolerated full cup of ice chips and working on her second cup of ice

## 2022-10-04 NOTE — ANESTHESIA PREPROCEDURE EVALUATION
Procedure:  TONSILLECTOMY (Bilateral Throat)    Relevant Problems   No relevant active problems        Physical Exam    Airway    Mallampati score: I  TM Distance: >3 FB  Neck ROM: full     Dental   No notable dental hx     Cardiovascular      Pulmonary      Other Findings  Tearful, anxious      Anesthesia Plan  ASA Score- 1     Anesthesia Type- general with ASA Monitors  Additional Monitors:   Airway Plan: ETT  Plan Factors-    Chart reviewed  Existing labs reviewed  Patient summary reviewed  Induction- intravenous  Postoperative Plan- Plan for postoperative opioid use  Planned trial extubation    Informed Consent- Anesthetic plan and risks discussed with patient  I personally reviewed this patient with the CRNA  Discussed and agreed on the Anesthesia Plan with the CRNA  Noe Liu

## 2022-10-13 PROCEDURE — 88304 TISSUE EXAM BY PATHOLOGIST: CPT | Performed by: PATHOLOGY

## 2022-10-21 ENCOUNTER — APPOINTMENT (OUTPATIENT)
Dept: RADIOLOGY | Facility: CLINIC | Age: 37
End: 2022-10-21
Payer: COMMERCIAL

## 2022-10-21 DIAGNOSIS — J34.89 SINUS PAIN: ICD-10-CM

## 2022-10-21 PROCEDURE — 70220 X-RAY EXAM OF SINUSES: CPT

## 2023-10-11 ENCOUNTER — OFFICE VISIT (OUTPATIENT)
Dept: URGENT CARE | Facility: CLINIC | Age: 38
End: 2023-10-11
Payer: COMMERCIAL

## 2023-10-11 VITALS
HEART RATE: 86 BPM | TEMPERATURE: 98.4 F | DIASTOLIC BLOOD PRESSURE: 77 MMHG | OXYGEN SATURATION: 99 % | SYSTOLIC BLOOD PRESSURE: 134 MMHG

## 2023-10-11 DIAGNOSIS — U07.1 COVID: ICD-10-CM

## 2023-10-11 DIAGNOSIS — L98.8 MACERATION OF SKIN: Primary | ICD-10-CM

## 2023-10-11 DIAGNOSIS — R23.8 COVID TOES: ICD-10-CM

## 2023-10-11 DIAGNOSIS — S91.105A OPEN WOUND OF FIFTH TOE OF LEFT FOOT, INITIAL ENCOUNTER: ICD-10-CM

## 2023-10-11 DIAGNOSIS — U07.1 COVID TOES: ICD-10-CM

## 2023-10-11 PROCEDURE — 99213 OFFICE O/P EST LOW 20 MIN: CPT | Performed by: NURSE PRACTITIONER

## 2023-10-11 PROCEDURE — S9088 SERVICES PROVIDED IN URGENT: HCPCS | Performed by: NURSE PRACTITIONER

## 2023-10-11 NOTE — PATIENT INSTRUCTIONS
You are to keep the toe dry and open to air as much as possible.   Stop the hydrocortisone cream for now  Use a mole skin or lambs wool, or non adherent pad between the toes since they are rubbing together  Follow up with your podiatrist as scheduled  Follow up with your PCP in 3-5 days   Go to the ED if symptoms worsen

## 2023-10-11 NOTE — PROGRESS NOTES
[facility]        NAME: Ni Muñiz is a 41 y.o. female  : 1985    MRN: [id number]  DATE: 2023  TIME: 10:35 AM    Assessment and Plan   Maceration of skin [L98.8]  1. Maceration of skin        2. Open wound of fifth toe of left foot, initial encounter      between 4th and 5th toe on 4th toe      3. COVID toes      history of 3rd, 4th, 5th      4. COVID      recent            Patient Instructions       Follow up with PCP in 3-5 days. Proceed to  ER if symptoms worsen. You are to keep the toe dry and open to air as much as possible. Stop the hydrocortisone cream for now  Use a mole skin or lambs wool, or non adherent pad between the toes since they are rubbing together  Follow up with your podiatrist as scheduled  Follow up with your PCP in 3-5 days   Go to the ED if symptoms worsen        Chief Complaint     Chief Complaint   Patient presents with    Foot Pain     Dx with covid toe left foot on  tx hydrocortisone cream . Present today with open area between fourth and fifth toe          History of Present Illness       This is a 41year old female who states recently had covid and had covid toes left foot, 3/4/5. She states she saw her podiatrist and was instructed to use hydrocortisone cream. She states she has been doing so and now has an open wound between her 4/5th toes. She is concerned it is infected. She states the toes are rubbing together and it is painful. She denies fevers, chills, drainage. She states she has been wearing open toed shoes since. Review of Systems   Review of Systems   Constitutional: Negative. HENT: Negative. Eyes: Negative. Respiratory: Negative. Cardiovascular: Negative. Gastrointestinal: Negative. Endocrine: Negative. Genitourinary: Negative. Musculoskeletal: Negative. Skin:  Positive for wound. Allergic/Immunologic: Negative. Neurological: Negative. Hematological: Negative. Psychiatric/Behavioral: Negative. Current Medications       Current Outpatient Medications:     Cromolyn Sodium (NASAL ALLERGY NA), into each nostril daily as needed , Disp: , Rfl:     fexofenadine (ALLEGRA) 180 MG tablet, Take 180 mg by mouth daily, Disp: , Rfl:     acetaminophen (TYLENOL) 500 mg tablet, Take 500 mg by mouth every 6 (six) hours as needed, Disp: , Rfl:     B Complex Vitamins (B COMPLEX 100 PO), Take 1 tablet by mouth daily (Patient not taking: Reported on 10/11/2023), Disp: , Rfl:     co-enzyme Q-10 30 MG capsule, Take 30 mg by mouth Three times a day, Disp: , Rfl:     diphenhydrAMINE (BENADRYL) 25 mg capsule, Take 25 mg by mouth every 6 (six) hours as needed, Disp: , Rfl:     ibuprofen (MOTRIN) 200 mg tablet, Take 200 mg by mouth every 6 (six) hours as needed, Disp: , Rfl:     meclizine (ANTIVERT) 25 mg tablet, Take 1 tablet (25 mg total) by mouth 3 (three) times a day as needed for dizziness, Disp: 30 tablet, Rfl: 0    ondansetron (Zofran ODT) 4 mg disintegrating tablet, Take 1 tablet (4 mg total) by mouth every 6 (six) hours as needed for nausea or vomiting, Disp: 20 tablet, Rfl: 0    oxyCODONE-acetaminophen (PERCOCET) 5-325 mg per tablet, Take 1 tablet by mouth every 6 (six) hours as needed for moderate pain Max Daily Amount: 4 tablets, Disp: 20 tablet, Rfl: 0    predniSONE 20 mg tablet, 3 tabs day 1-3, 2 tabs day 4-6, 1 tab day 7-9, Disp: 18 tablet, Rfl: 0    Specialty Vitamins Products (magnesium, amino acid chelate,) 133 MG tablet, Take 250 mg by mouth daily, Disp: , Rfl:     SUMAtriptan (IMITREX) 100 mg tablet, Take 1 tab PO, at the onset of Migraine. May repeat in 1 hour.  Limit 2 tabs per day, 2 days per week, Disp: , Rfl:     Current Allergies     Allergies as of 10/11/2023 - Reviewed 10/11/2023   Allergen Reaction Noted    Dust mite extract Sneezing 01/04/2021    Phenothiazines Other (See Comments) 09/13/2007    Thorazine [chlorpromazine] Other (See Comments) 06/13/2017 The following portions of the patient's history were reviewed and updated as appropriate: allergies, current medications, past family history, past medical history, past social history, past surgical history and problem list.     Past Medical History:   Diagnosis Date    Anxiety     Depression     GERD (gastroesophageal reflux disease)     House dust mite allergy     Hyperlipidemia     Migraine     Palpitations        Past Surgical History:   Procedure Laterality Date    BREAST BIOPSY Bilateral     benign on each side    COLONOSCOPY      TONSILLECTOMY Bilateral 10/4/2022    Procedure: TONSILLECTOMY;  Surgeon: Tino Covert, DO;  Location: [facility];  Service: ENT    WISDOM TOOTH EXTRACTION         Family History   Problem Relation Age of Onset    Hyperlipidemia Mother     Fibromyalgia Mother     Rheum arthritis Mother     Cancer Father          Medications have been verified. Objective   /77 (BP Location: Right arm, Patient Position: Sitting, Cuff Size: Standard)   Pulse 86   Temp 98.4 °F (36.9 °C) (Tympanic)   SpO2 99%   No LMP recorded. Physical Exam     Physical Exam  Vitals and nursing note reviewed. Constitutional:       General: She is not in acute distress. Appearance: Normal appearance. She is normal weight. She is not ill-appearing, toxic-appearing or diaphoretic. HENT:      Head: Normocephalic and atraumatic. Nose: Nose normal.      Mouth/Throat:      Mouth: Mucous membranes are moist.   Eyes:      Extraocular Movements: Extraocular movements intact. Cardiovascular:      Rate and Rhythm: Normal rate. Pulses: Normal pulses. Pulmonary:      Effort: Pulmonary effort is normal.   Musculoskeletal:         General: Normal range of motion. Cervical back: Normal range of motion. Skin:     General: Skin is warm and dry. Capillary Refill: Capillary refill takes less than 2 seconds. Findings: No erythema.       Comments: 3rd, 4th, 5th toes of left foot remain very pale pink on dorsal surface. There is an approximately 1 mm round open area on the 5th toe between 4th and 5th toes. No redness, drainage. Skin appears wet and white. White edges around open epidermis. Neurological:      General: No focal deficit present. Mental Status: She is alert and oriented to person, place, and time. Psychiatric:         Mood and Affect: Mood normal.         Behavior: Behavior normal.         Thought Content:  Thought content normal.         Judgment: Judgment normal.

## 2023-11-22 ENCOUNTER — OFFICE VISIT (OUTPATIENT)
Dept: URGENT CARE | Facility: CLINIC | Age: 38
End: 2023-11-22
Payer: COMMERCIAL

## 2023-11-22 VITALS
TEMPERATURE: 98.9 F | SYSTOLIC BLOOD PRESSURE: 102 MMHG | BODY MASS INDEX: 21.95 KG/M2 | HEART RATE: 78 BPM | HEIGHT: 64 IN | DIASTOLIC BLOOD PRESSURE: 59 MMHG | OXYGEN SATURATION: 98 % | WEIGHT: 128.6 LBS | RESPIRATION RATE: 20 BRPM

## 2023-11-22 DIAGNOSIS — T78.40XA ALLERGIC REACTION, INITIAL ENCOUNTER: Primary | ICD-10-CM

## 2023-11-22 PROCEDURE — S9088 SERVICES PROVIDED IN URGENT: HCPCS | Performed by: PHYSICIAN ASSISTANT

## 2023-11-22 PROCEDURE — 99213 OFFICE O/P EST LOW 20 MIN: CPT | Performed by: PHYSICIAN ASSISTANT

## 2023-11-22 RX ORDER — PREDNISONE 10 MG/1
TABLET ORAL
Qty: 26 TABLET | Refills: 0 | Status: SHIPPED | OUTPATIENT
Start: 2023-11-22

## 2023-11-22 NOTE — PROGRESS NOTES
[facility]    NAME: Sean Muñiz is a 41 y.o. female  : 1985    MRN: [id number]  DATE: 2023  TIME: 1:37 PM    Assessment and Plan   Allergic reaction, initial encounter Leroy Mendez  1. Allergic reaction, initial encounter  predniSONE 10 mg tablet          Patient Instructions     Patient Instructions   Prednisone as directed. Continue with allegra, flonase, astlelin and drops as directed. Chief Complaint     Chief Complaint   Patient presents with    Sinusitis     Sinus congestion since yesterday with scratchy throat-not sore        History of Present Illness   41year old female here with complaint of nasal congestion, itchiness, eye symptoms started yesterday after working around dust in her son's room. Has a history of dust mite allergy. Takes antihistamine, eye drops, nasal sprays. Not helping. Review of Systems   Review of Systems   Constitutional:  Negative for appetite change, chills and fever. HENT:  Positive for congestion, postnasal drip and sore throat. Negative for ear discharge, ear pain, facial swelling, sinus pressure and sneezing. Eyes:  Positive for itching. Respiratory:  Negative for cough, shortness of breath and wheezing. Neurological:  Negative for headaches. All other systems reviewed and are negative.       Current Medications     Current Outpatient Medications:     acetaminophen (TYLENOL) 500 mg tablet, Take 500 mg by mouth every 6 (six) hours as needed, Disp: , Rfl:     co-enzyme Q-10 30 MG capsule, Take 30 mg by mouth Three times a day, Disp: , Rfl:     Cromolyn Sodium (NASAL ALLERGY NA), into each nostril daily as needed , Disp: , Rfl:     diphenhydrAMINE (BENADRYL) 25 mg capsule, Take 25 mg by mouth every 6 (six) hours as needed, Disp: , Rfl:     fexofenadine (ALLEGRA) 180 MG tablet, Take 180 mg by mouth daily, Disp: , Rfl:     ibuprofen (MOTRIN) 200 mg tablet, Take 200 mg by mouth every 6 (six) hours as needed, Disp: , Rfl: meclizine (ANTIVERT) 25 mg tablet, Take 1 tablet (25 mg total) by mouth 3 (three) times a day as needed for dizziness, Disp: 30 tablet, Rfl: 0    predniSONE 10 mg tablet, Take 3 tabs BID X 2 days, 2 tabs BID X 2 days, 1 tab BID X 2 days, 1 tab daily X 2 days, Disp: 26 tablet, Rfl: 0    Specialty Vitamins Products (magnesium, amino acid chelate,) 133 MG tablet, Take 250 mg by mouth daily, Disp: , Rfl:     SUMAtriptan (IMITREX) 100 mg tablet, Take 1 tab PO, at the onset of Migraine. May repeat in 1 hour.  Limit 2 tabs per day, 2 days per week, Disp: , Rfl:     B Complex Vitamins (B COMPLEX 100 PO), Take 1 tablet by mouth daily (Patient not taking: Reported on 10/11/2023), Disp: , Rfl:     ondansetron (Zofran ODT) 4 mg disintegrating tablet, Take 1 tablet (4 mg total) by mouth every 6 (six) hours as needed for nausea or vomiting (Patient not taking: Reported on 11/22/2023), Disp: 20 tablet, Rfl: 0    oxyCODONE-acetaminophen (PERCOCET) 5-325 mg per tablet, Take 1 tablet by mouth every 6 (six) hours as needed for moderate pain Max Daily Amount: 4 tablets, Disp: 20 tablet, Rfl: 0    predniSONE 20 mg tablet, 3 tabs day 1-3, 2 tabs day 4-6, 1 tab day 7-9, Disp: 18 tablet, Rfl: 0    Current Allergies     Allergies as of 11/22/2023 - Reviewed 11/22/2023   Allergen Reaction Noted    Dust mite extract Sneezing 01/04/2021    Phenothiazines Other (See Comments) 09/13/2007    Thorazine [chlorpromazine] Other (See Comments) 06/13/2017          The following portions of the patient's history were reviewed and updated as appropriate: allergies, current medications, past family history, past medical history, past social history, past surgical history and problem list.   Past Medical History:   Diagnosis Date    Anxiety     Depression     GERD (gastroesophageal reflux disease)     House dust mite allergy     Hyperlipidemia     Migraine     Palpitations      Past Surgical History:   Procedure Laterality Date    BREAST BIOPSY Bilateral benign on each side    COLONOSCOPY      TONSILLECTOMY Bilateral 10/4/2022    Procedure: TONSILLECTOMY;  Surgeon: Hailey Rios DO;  Location: [facility];  Service: ENT    WISDOM TOOTH EXTRACTION       Family History   Problem Relation Age of Onset    Hyperlipidemia Mother     Fibromyalgia Mother     Rheum arthritis Mother     Cancer Father      Social History     Socioeconomic History    Marital status: /Civil Union     Spouse name: Not on file    Number of children: Not on file    Years of education: Not on file    Highest education level: Not on file   Occupational History    Not on file   Tobacco Use    Smoking status: Never    Smokeless tobacco: Never   Vaping Use    Vaping Use: Never used   Substance and Sexual Activity    Alcohol use: Not Currently    Drug use: Yes     Frequency: 2.0 times per week     Types: Marijuana     Comment: medical    Sexual activity: Yes   Other Topics Concern    Not on file   Social History Narrative    Not on file     Social Determinants of Health     Financial Resource Strain: Not on file   Food Insecurity: Not on file   Transportation Needs: Not on file   Physical Activity: Not on file   Stress: Not on file   Social Connections: Not on file   Intimate Partner Violence: Not on file   Housing Stability: Not on file     Medications have been verified. Objective   /59   Pulse 78   Temp 98.9 °F (37.2 °C)   Resp 20   Ht 5' 4" (1.626 m)   Wt 58.3 kg (128 lb 9.6 oz)   SpO2 98%   BMI 22.07 kg/m²      Physical Exam   Physical Exam  Vitals and nursing note reviewed. Constitutional:       General: She is not in acute distress. Appearance: She is well-developed. HENT:      Head: Normocephalic and atraumatic. Right Ear: Tympanic membrane normal.      Left Ear: Tympanic membrane normal.      Nose: Congestion and rhinorrhea present. No mucosal edema. Right Sinus: No maxillary sinus tenderness or frontal sinus tenderness.       Left Sinus: No maxillary sinus tenderness or frontal sinus tenderness. Mouth/Throat:      Pharynx: No oropharyngeal exudate or posterior oropharyngeal erythema. Eyes:      Conjunctiva/sclera: Conjunctivae normal.   Cardiovascular:      Rate and Rhythm: Normal rate and regular rhythm. Heart sounds: Normal heart sounds. No murmur heard.

## 2023-11-27 ENCOUNTER — OFFICE VISIT (OUTPATIENT)
Dept: URGENT CARE | Facility: CLINIC | Age: 38
End: 2023-11-27
Payer: COMMERCIAL

## 2023-11-27 VITALS
DIASTOLIC BLOOD PRESSURE: 65 MMHG | SYSTOLIC BLOOD PRESSURE: 119 MMHG | OXYGEN SATURATION: 98 % | BODY MASS INDEX: 22.3 KG/M2 | TEMPERATURE: 98.9 F | RESPIRATION RATE: 20 BRPM | HEIGHT: 64 IN | HEART RATE: 74 BPM | WEIGHT: 130.6 LBS

## 2023-11-27 DIAGNOSIS — J01.90 ACUTE SINUSITIS, RECURRENCE NOT SPECIFIED, UNSPECIFIED LOCATION: Primary | ICD-10-CM

## 2023-11-27 PROCEDURE — S9088 SERVICES PROVIDED IN URGENT: HCPCS | Performed by: PHYSICIAN ASSISTANT

## 2023-11-27 PROCEDURE — 99213 OFFICE O/P EST LOW 20 MIN: CPT | Performed by: PHYSICIAN ASSISTANT

## 2023-11-27 RX ORDER — AMOXICILLIN AND CLAVULANATE POTASSIUM 875; 125 MG/1; MG/1
1 TABLET, FILM COATED ORAL EVERY 12 HOURS SCHEDULED
Qty: 14 TABLET | Refills: 0 | Status: SHIPPED | OUTPATIENT
Start: 2023-11-27 | End: 2023-12-04

## 2023-11-27 NOTE — PROGRESS NOTES
[facility]        NAME: Kristan Muñiz is a 41 y.o. female  : 1985    MRN: [id number]  DATE: 2023  TIME: 10:02 AM    Assessment and Plan   Acute sinusitis, recurrence not specified, unspecified location [J01.90]  1. Acute sinusitis, recurrence not specified, unspecified location  amoxicillin-clavulanate (AUGMENTIN) 875-125 mg per tablet            Patient Instructions       Follow up with PCP in 3-5 days. Proceed to  ER if symptoms worsen. Chief Complaint     Chief Complaint   Patient presents with    Sinusitis     Since last week, sinus pressure and pain. Recently seen and placed on steroids, 2 days left          History of Present Illness       Patient is a 41year old female presenting to [facility] with persistent congestion, ear burning, nasal congestion. Patient symptoms began 1 week ago s/p exposure to dust mites. Pt has 2 more days of Prednisone to complete. Sinusitis  This is a new problem. The current episode started in the past 7 days. The problem has been gradually worsening since onset. There has been no fever. Associated symptoms include congestion, coughing and ear pain. Pertinent negatives include no chills, shortness of breath or sore throat. Review of Systems   Review of Systems   Constitutional:  Negative for chills and fever. HENT:  Positive for congestion, ear pain and rhinorrhea. Negative for sore throat. Eyes:  Negative for pain and visual disturbance. Respiratory:  Positive for cough. Negative for shortness of breath. Cardiovascular:  Negative for chest pain and palpitations. Gastrointestinal:  Negative for abdominal pain and vomiting. Genitourinary:  Negative for dysuria and hematuria. Musculoskeletal:  Negative for arthralgias and back pain. Skin:  Negative for color change and rash. Neurological:  Negative for seizures and syncope. All other systems reviewed and are negative.         Current Medications       Current Outpatient Medications:     acetaminophen (TYLENOL) 500 mg tablet, Take 500 mg by mouth every 6 (six) hours as needed, Disp: , Rfl:     amoxicillin-clavulanate (AUGMENTIN) 875-125 mg per tablet, Take 1 tablet by mouth every 12 (twelve) hours for 7 days, Disp: 14 tablet, Rfl: 0    co-enzyme Q-10 30 MG capsule, Take 30 mg by mouth Three times a day, Disp: , Rfl:     Cromolyn Sodium (NASAL ALLERGY NA), into each nostril daily as needed , Disp: , Rfl:     diphenhydrAMINE (BENADRYL) 25 mg capsule, Take 25 mg by mouth every 6 (six) hours as needed, Disp: , Rfl:     fexofenadine (ALLEGRA) 180 MG tablet, Take 180 mg by mouth daily, Disp: , Rfl:     ibuprofen (MOTRIN) 200 mg tablet, Take 200 mg by mouth every 6 (six) hours as needed, Disp: , Rfl:     meclizine (ANTIVERT) 25 mg tablet, Take 1 tablet (25 mg total) by mouth 3 (three) times a day as needed for dizziness, Disp: 30 tablet, Rfl: 0    predniSONE 10 mg tablet, Take 3 tabs BID X 2 days, 2 tabs BID X 2 days, 1 tab BID X 2 days, 1 tab daily X 2 days, Disp: 26 tablet, Rfl: 0    Specialty Vitamins Products (magnesium, amino acid chelate,) 133 MG tablet, Take 250 mg by mouth daily, Disp: , Rfl:     SUMAtriptan (IMITREX) 100 mg tablet, Take 1 tab PO, at the onset of Migraine. May repeat in 1 hour.  Limit 2 tabs per day, 2 days per week, Disp: , Rfl:     B Complex Vitamins (B COMPLEX 100 PO), Take 1 tablet by mouth daily (Patient not taking: Reported on 10/11/2023), Disp: , Rfl:     ondansetron (Zofran ODT) 4 mg disintegrating tablet, Take 1 tablet (4 mg total) by mouth every 6 (six) hours as needed for nausea or vomiting (Patient not taking: Reported on 11/22/2023), Disp: 20 tablet, Rfl: 0    oxyCODONE-acetaminophen (PERCOCET) 5-325 mg per tablet, Take 1 tablet by mouth every 6 (six) hours as needed for moderate pain Max Daily Amount: 4 tablets, Disp: 20 tablet, Rfl: 0    predniSONE 20 mg tablet, 3 tabs day 1-3, 2 tabs day 4-6, 1 tab day 7-9, Disp: 18 tablet, Rfl: 0    Current Allergies     Allergies as of 11/27/2023 - Reviewed 11/27/2023   Allergen Reaction Noted    Dust mite extract Sneezing 01/04/2021    Phenothiazines Other (See Comments) 09/13/2007    Thorazine [chlorpromazine] Other (See Comments) 06/13/2017            The following portions of the patient's history were reviewed and updated as appropriate: allergies, current medications, past family history, past medical history, past social history, past surgical history and problem list.     Past Medical History:   Diagnosis Date    Anxiety     Depression     GERD (gastroesophageal reflux disease)     House dust mite allergy     Hyperlipidemia     Migraine     Palpitations        Past Surgical History:   Procedure Laterality Date    BREAST BIOPSY Bilateral     benign on each side    COLONOSCOPY      TONSILLECTOMY Bilateral 10/4/2022    Procedure: TONSILLECTOMY;  Surgeon: Juvenal Cotton DO;  Location: [facility];  Service: ENT    WISDOM TOOTH EXTRACTION         Family History   Problem Relation Age of Onset    Hyperlipidemia Mother     Fibromyalgia Mother     Rheum arthritis Mother     Cancer Father          Medications have been verified. Objective   /65   Pulse 74   Temp 98.9 °F (37.2 °C)   Resp 20   Ht 5' 4" (1.626 m)   Wt 59.2 kg (130 lb 9.6 oz)   SpO2 98%   BMI 22.42 kg/m²   No LMP recorded. Physical Exam     Physical Exam  Constitutional:       Appearance: Normal appearance. HENT:      Head: Normocephalic and atraumatic. Nose: Congestion present. Mouth/Throat:      Mouth: Mucous membranes are moist.   Eyes:      Extraocular Movements: Extraocular movements intact. Conjunctiva/sclera: Conjunctivae normal.      Pupils: Pupils are equal, round, and reactive to light. Cardiovascular:      Rate and Rhythm: Normal rate. Pulmonary:      Effort: Pulmonary effort is normal.   Musculoskeletal:         General: Normal range of motion.       Cervical back: Normal range of motion and neck supple. Skin:     General: Skin is warm and dry. Capillary Refill: Capillary refill takes less than 2 seconds. Neurological:      General: No focal deficit present. Mental Status: She is alert and oriented to person, place, and time.    Psychiatric:         Mood and Affect: Mood normal.         Behavior: Behavior normal.

## 2024-04-26 ENCOUNTER — OFFICE VISIT (OUTPATIENT)
Dept: PHYSICAL THERAPY | Facility: HOME HEALTHCARE | Age: 39
End: 2024-04-26
Payer: COMMERCIAL

## 2024-04-26 DIAGNOSIS — R42 DIZZINESS: Primary | ICD-10-CM

## 2024-04-26 PROCEDURE — 95992 CANALITH REPOSITIONING PROC: CPT | Performed by: PHYSICAL THERAPIST

## 2024-04-26 PROCEDURE — 97161 PT EVAL LOW COMPLEX 20 MIN: CPT | Performed by: PHYSICAL THERAPIST

## 2024-04-26 NOTE — LETTER
2024      No Recipients    Patient: Katerin Muñiz   YOB: 1985   Date of Visit: 2024     Encounter Diagnosis     ICD-10-CM    1. Dizziness  R42           Dear Dr. Yates Recipients:    Thank you for your recent referral of Katerin Muñiz. Please review the attached evaluation summary from Katerin BLOUNT's recent visit.     Please verify that you agree with the plan of care by signing the attached order.     If you have any questions or concerns, please do not hesitate to call.     I sincerely appreciate the opportunity to share in the care of one of your patients and hope to have another opportunity to work with you in the near future.       Sincerely,    Nubia Sanchez, PT      Referring Provider:      I certify that I have read the below Plan of Care and certify the need for these services furnished under this plan of treatment while under my care.                      No Recipients          PT Evaluation     Today's date: 2024  Patient name: Katerin Muñiz  : 1985  MRN: [id number]  Referring provider: Tony Hidalgo MD  Dx:   Encounter Diagnosis     ICD-10-CM    1. Dizziness  R42                      Assessment  Assessment details: Patient Katerin Muñiz is a 38 y.o. who presents with s/s consistent with BPPV.  Based on presentation the patient has a negative Hallpike-Graham bilaterally but she was (+) for symptoms with R roll test. Patient responded well to a particular repositioning with bbq roll technique but did remain with some dizziness to end session.  Pt will follow up for treatments as indicated to address deficits. Pt would benefit from skilled therapy to address outlined symptoms and return safety with daily activities.  Impairments: abnormal gait, abnormal or restricted ROM, abnormal movement, activity intolerance, impaired balance, lacks appropriate home exercise program and safety issue  Understanding of Dx/Px/POC: good   Prognosis: good    Goals  STG: to  "be achieved in 4 weeks   1) Independent in basic Home Exercise Program for balance   2) Pt reporting decrease in vertiginous symptoms > 50%     LTG: to be achieved in 8 weeks   1) Pt demonstrating low fall risk on balance assessment    2) Pt reporting alleviation of vertiginous symptoms   3) Pt to be free from s/s of BPPV in bilateral canals   4) Pt free from falls     Plan  Patient would benefit from: skilled physical therapy  Planned therapy interventions: balance, manual therapy, canalith repositioning, neuromuscular re-education, patient education, postural training, therapeutic exercise, therapeutic activities, home exercise program, functional ROM exercises and gait training  Frequency: 2x week  Duration in weeks: 6  Plan of Care beginning date: 4/26/2024  Plan of Care expiration date: 6/7/2024  Treatment plan discussed with: patient and referring physician        Subjective Evaluation    History of Present Illness  Mechanism of injury: Pt reporting that she woke up in the middle of the night last night feeling a little dizzy. She notes that when she rolled onto the R side it was worse than to the L side.  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   Quality of life: good    Patient Goals  Patient goal: \"get rid of the dizziness\"    Diagnostic Tests  No diagnostic tests performed  Treatments  Current treatment: physical therapy        Objective     Concurrent Complaints  Positive for headaches.   Neuro Exam:     Dizziness  Positive for disequilibrium, vertigo, oscillopsia, motion sickness, light-headedness, rocking or swaying, diplopia and floating or swimming.     Exacerbating factors  Positive for bending over, rolling in bed (R), looking up, turning head, supine to/from sitting, optokinetic movement and walking in busy environment.   Negative for walking.     Headaches   Patient reports headaches: Yes.     Positional testing   Lotus-Hallpike   Left posterior " canal: WNL  Right posterior canal: WNL  Roll test   Right horizontal canal: symptomatic             Re-eval Date: 5/26/24  Precautions: vertigo        Manuals 4/26                                       Neuro Re-Ed         [place]-Hallpike  (-) kenzie        Epley         BBQ roll  R side x 2       VOR x 1         VOR x 2         Rivera-daroff                         Ther Ex        NuStep - sx dump                                                                 Ther Activity                        Gait Training                        Modalities

## 2024-04-26 NOTE — PROGRESS NOTES
PT Evaluation     Today's date: 2024  Patient name: Katerin Muñiz  : 1985  MRN: [id number]  Referring provider: Tony Hidalgo MD  Dx:   Encounter Diagnosis     ICD-10-CM    1. Dizziness  R42                      Assessment  Assessment details: Patient Katerin Muñiz is a 38 y.o. who presents with s/s consistent with BPPV.  Based on presentation the patient has a negative Hallpike-[place] bilaterally but she was (+) for symptoms with R roll test. Patient responded well to a particular repositioning with bbq roll technique but did remain with some dizziness to end session.  Pt will follow up for treatments as indicated to address deficits. Pt would benefit from skilled therapy to address outlined symptoms and return safety with daily activities.  Impairments: abnormal gait, abnormal or restricted ROM, abnormal movement, activity intolerance, impaired balance, lacks appropriate home exercise program and safety issue  Understanding of Dx/Px/POC: good   Prognosis: good    Goals  STG: to be achieved in 4 weeks   1) Independent in basic Home Exercise Program for balance   2) Pt reporting decrease in vertiginous symptoms > 50%     LTG: to be achieved in 8 weeks   1) Pt demonstrating low fall risk on balance assessment    2) Pt reporting alleviation of vertiginous symptoms   3) Pt to be free from s/s of BPPV in bilateral canals   4) Pt free from falls     Plan  Patient would benefit from: skilled physical therapy  Planned therapy interventions: balance, manual therapy, canalith repositioning, neuromuscular re-education, patient education, postural training, therapeutic exercise, therapeutic activities, home exercise program, functional ROM exercises and gait training  Frequency: 2x week  Duration in weeks: 6  Plan of Care beginning date: 2024  Plan of Care expiration date: 2024  Treatment plan discussed with: patient and referring physician        Subjective Evaluation    History of Present  "Illness  Mechanism of injury: Pt reporting that she woke up in the middle of the night last night feeling a little dizzy. She notes that when she rolled onto the R side it was worse than to the L side.  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   Quality of life: good    Patient Goals  Patient goal: \"get rid of the dizziness\"    Diagnostic Tests  No diagnostic tests performed  Treatments  Current treatment: physical therapy        Objective     Concurrent Complaints  Positive for headaches.   Neuro Exam:     Dizziness  Positive for disequilibrium, vertigo, oscillopsia, motion sickness, light-headedness, rocking or swaying, diplopia and floating or swimming.     Exacerbating factors  Positive for bending over, rolling in bed (R), looking up, turning head, supine to/from sitting, optokinetic movement and walking in busy environment.   Negative for walking.     Headaches   Patient reports headaches: Yes.     Positional testing   Lotus-Hallpike   Left posterior canal: WNL  Right posterior canal: WNL  Roll test   Right horizontal canal: symptomatic             Re-eval Date: 5/26/24  Precautions: vertigo        Manuals 4/26                                       Neuro Re-Ed         Lotus-Hallpike  (-) kenzie        Epley         BBQ roll  R side x 2       VOR x 1         VOR x 2         Rivera-daroff                         Ther Ex        NuStep - sx dump                                                                 Ther Activity                        Gait Training                        Modalities                                "

## 2024-04-29 ENCOUNTER — OFFICE VISIT (OUTPATIENT)
Dept: PHYSICAL THERAPY | Facility: HOME HEALTHCARE | Age: 39
End: 2024-04-29
Payer: COMMERCIAL

## 2024-04-29 DIAGNOSIS — R42 DIZZINESS: Primary | ICD-10-CM

## 2024-04-29 PROCEDURE — 97112 NEUROMUSCULAR REEDUCATION: CPT | Performed by: PHYSICAL THERAPIST

## 2024-04-29 NOTE — PROGRESS NOTES
"Daily Note     Today's date: 2024  Patient name: Katerin Muñiz  : 1985  MRN: [id number]  Referring provider: Lo Laird PA-C  Dx:   Encounter Diagnosis     ICD-10-CM    1. Dizziness  R42                      Subjective: Pt reporting that she has been doing the log roll at home and no longer has dizziness with that.       Objective: See treatment diary below      Assessment: PT explored VOR activities with (+) hypofunction noted. Pt with greater challenge with checkered board background and with standing activities. Provided HEP for VOR re-training and will assess response at next visit.     Plan: Continue per plan of care.      Re-eval Date: 24  Precautions: vertigo        Manuals                                       Neuro Re-Ed         Lotus-Hallpike  (-) kenzie        Epley         BBQ roll  R side x 2       VOR x 1   80 bpm   R/L x 30\"  U/D x 20\"    Checkered board   R/L x 15\"        VOR x 2   Self pace   X 25\" each   (+) mild sx U/D    Checkered board   R/L 20\"   U/D 30\"       [facility] tracking   Standing R/L   X 10       Ball to table   X 10               Ther Ex        NuStep - sx dump                                                                 Ther Activity                        Gait Training                        Modalities                                "

## 2024-05-01 ENCOUNTER — OFFICE VISIT (OUTPATIENT)
Dept: PHYSICAL THERAPY | Facility: HOME HEALTHCARE | Age: 39
End: 2024-05-01
Payer: COMMERCIAL

## 2024-05-01 DIAGNOSIS — R42 DIZZINESS: Primary | ICD-10-CM

## 2024-05-01 PROCEDURE — 97110 THERAPEUTIC EXERCISES: CPT

## 2024-05-01 PROCEDURE — 97112 NEUROMUSCULAR REEDUCATION: CPT

## 2024-05-01 NOTE — PROGRESS NOTES
"Daily Note     Today's date: 2024  Patient name: Katerin Muñiz  : 1985  MRN: [id number]  Referring provider: Lo Laird PA-C  Dx:   Encounter Diagnosis     ICD-10-CM    1. Dizziness  R42                    Subjective: Pt reports she has been noticing with driving and being on her computer her eyes feel off by the end of the day.  Pt reports it makes her feel nauseous and tired.       Objective: See treatment diary below      Assessment: Progressed program today. Blurry vision with VOR x 1 and woozy/off balance feeling with  VOR x1 and VOR x 2  with checkered board background. Pt challenged with checkered board background. Unsteadiness and sway evident with Romberg and tandem stance with eyes closed on foam. Pt reports delayed feeling and off balance t/o session.   Patient would benefit from continued PT      Plan: Continue per plan of care.      Re-eval Date: 24  Precautions: vertigo        Manuals                                      Neuro Re-Ed         [place]-Hallpike  (-) kenzie        Epley         BBQ roll  R side x 2       VOR x 1   80 bpm   R/L x 30\"  U/D x 20\"    Checkered board   R/L x 15\"   80 bpm   R/L x 45\"  U/D x 30\"    Checkered board  R/L 23\"       VOR x 2   Self pace   X 25\" each   (+) mild sx U/D    Checkered board   R/L 20\"   U/D 30\"  Self pace  R/L 1'  U/D 40\"    Checkered board   R/L 25\"  U/D           [facility] tracking   Standing R/L   X 10  X 10      Ball to table   X 10  L/C/R <>x10     Main string    Checkered board   U/D x10     Amb with head turns    L/R  U/D   <> x 1 ea      Romberg stance   Foam EO/EC  30\" ea      Tandem stance   Foam EO  R/L 30\" ea     Foam EC  R/L 20\" ea      Ther Ex        NuStep - sx dump    L1 10'                                                              Ther Activity                        Gait Training                        Modalities                                  "

## 2024-05-09 ENCOUNTER — APPOINTMENT (OUTPATIENT)
Dept: PHYSICAL THERAPY | Facility: HOME HEALTHCARE | Age: 39
End: 2024-05-09
Payer: COMMERCIAL

## 2024-05-18 ENCOUNTER — OFFICE VISIT (OUTPATIENT)
Dept: URGENT CARE | Facility: CLINIC | Age: 39
End: 2024-05-18
Payer: COMMERCIAL

## 2024-05-18 VITALS
DIASTOLIC BLOOD PRESSURE: 56 MMHG | TEMPERATURE: 99.1 F | HEIGHT: 64 IN | HEART RATE: 71 BPM | SYSTOLIC BLOOD PRESSURE: 107 MMHG | RESPIRATION RATE: 20 BRPM | BODY MASS INDEX: 20.9 KG/M2 | WEIGHT: 122.4 LBS | OXYGEN SATURATION: 99 %

## 2024-05-18 DIAGNOSIS — N30.00 ACUTE CYSTITIS WITHOUT HEMATURIA: Primary | ICD-10-CM

## 2024-05-18 PROBLEM — E78.00 PURE HYPERCHOLESTEROLEMIA: Status: ACTIVE | Noted: 2024-01-16

## 2024-05-18 PROBLEM — F43.10 PTSD (POST-TRAUMATIC STRESS DISORDER): Chronic | Status: ACTIVE | Noted: 2018-12-19

## 2024-05-18 PROBLEM — N32.81 OAB (OVERACTIVE BLADDER): Status: ACTIVE | Noted: 2019-12-05

## 2024-05-18 LAB
SL AMB  POCT GLUCOSE, UA: ABNORMAL
SL AMB LEUKOCYTE ESTERASE,UA: ABNORMAL
SL AMB POCT BILIRUBIN,UA: ABNORMAL
SL AMB POCT BLOOD,UA: ABNORMAL
SL AMB POCT CLARITY,UA: ABNORMAL
SL AMB POCT COLOR,UA: YELLOW
SL AMB POCT KETONES,UA: ABNORMAL
SL AMB POCT NITRITE,UA: ABNORMAL
SL AMB POCT PH,UA: 6.5
SL AMB POCT SPECIFIC GRAVITY,UA: 1
SL AMB POCT URINE HCG: NORMAL
SL AMB POCT URINE PROTEIN: ABNORMAL
SL AMB POCT UROBILINOGEN: 0.2

## 2024-05-18 PROCEDURE — 87077 CULTURE AEROBIC IDENTIFY: CPT | Performed by: NURSE PRACTITIONER

## 2024-05-18 PROCEDURE — 81025 URINE PREGNANCY TEST: CPT | Performed by: NURSE PRACTITIONER

## 2024-05-18 PROCEDURE — 81002 URINALYSIS NONAUTO W/O SCOPE: CPT | Performed by: NURSE PRACTITIONER

## 2024-05-18 PROCEDURE — 87086 URINE CULTURE/COLONY COUNT: CPT | Performed by: NURSE PRACTITIONER

## 2024-05-18 PROCEDURE — 99213 OFFICE O/P EST LOW 20 MIN: CPT | Performed by: NURSE PRACTITIONER

## 2024-05-18 PROCEDURE — 87186 SC STD MICRODIL/AGAR DIL: CPT | Performed by: NURSE PRACTITIONER

## 2024-05-18 PROCEDURE — S9088 SERVICES PROVIDED IN URGENT: HCPCS | Performed by: NURSE PRACTITIONER

## 2024-05-18 RX ORDER — CEPHALEXIN 500 MG/1
500 CAPSULE ORAL 2 TIMES DAILY
Qty: 10 CAPSULE | Refills: 0 | Status: SHIPPED | OUTPATIENT
Start: 2024-05-18 | End: 2024-05-23

## 2024-05-18 RX ORDER — CELECOXIB 200 MG/1
200 CAPSULE ORAL 2 TIMES DAILY
COMMUNITY
Start: 2024-04-26 | End: 2024-05-18 | Stop reason: ALTCHOICE

## 2024-05-18 NOTE — PROGRESS NOTES
[facility]        NAME: Katerin Muñiz is a 39 y.o. female  : 1985    MRN: [id number]  DATE: May 18, 2024  TIME: 5:23 PM      Assessment and Plan     Acute cystitis without hematuria [N30.00]  1. Acute cystitis without hematuria  POCT urine dip    Urine culture    Urine culture    POCT urine HCG    cephalexin (KEFLEX) 500 mg capsule            Patient Instructions     Patient Instructions   Urinary Tract Infection in Women   AMBULATORY CARE:   A urinary tract infection (UTI)  is caused by bacteria that get inside your urinary tract. Your urinary tract includes your kidneys, ureters, bladder, and urethra. A UTI is more common in your lower urinary tract, which includes your bladder and urethra.       Common symptoms include the following:   Urinating more often or waking from sleep to urinate    Pain or burning when you urinate    Pain or pressure in your lower abdomen and back    Urine that smells bad    Blood in your urine    Leaking urine    Seek care immediately if:   You are urinating very little or not at all.    You have a high fever with shaking chills.    You have side or back pain that gets worse.    Call your doctor if:   You have a fever.    You do not feel better after 2 days of taking antibiotics.    You have new symptoms, such as blood or pus in your urine.    You are vomiting.    You have questions or concerns about your condition or care.    Treatment for a UTI  may include antibiotics to treat a bacterial infection. You may also need medicines to decrease pain and burning, or decrease the urge to urinate often. If you have UTIs often (called recurrent UTIs), you may be given antibiotics to take regularly. You will be given directions for when and how to use antibiotics. The goal is to prevent UTIs but not cause antibiotic resistance by using antibiotics too often.  Prevent a UTI:   Empty your bladder often.  Urinate and empty your bladder as soon as you feel the need. Do not  hold your urine for long periods of time.    Wipe from front to back after you urinate or have a bowel movement.  This will help prevent germs from getting into your urinary tract through your urethra.    Drink liquids as directed.  Ask how much liquid to drink each day and which liquids are best for you. You may need to drink more liquids than usual to help flush out the bacteria. Do not drink alcohol, caffeine, or citrus juices. These can irritate your bladder and increase your symptoms. Your healthcare provider may recommend cranberry juice to help prevent a UTI.    Urinate before and after you have sex.  This can help flush out bacteria passed during sex.    Do not douche or use feminine deodorants.  These can change the chemical balance in your vagina.    Change sanitary pads or tampons often.  This will help prevent germs from getting into your urinary tract.    Talk to your healthcare provider about your birth control method.  You may need to change your method if it is increasing your risk for UTIs.    Wear cotton underwear and clothes that are loose.  Tight pants and nylon underwear can trap moisture and cause bacteria to grow.    Vaginal estrogen may be recommended.  This medicine helps prevent UTIs in women who have gone through menopause or are in twila-menopause.    Do pelvic muscle exercises often.  Pelvic muscle exercises may help you start and stop urinating. Strong pelvic muscles may help you empty your bladder easier. Squeeze these muscles tightly for 5 seconds like you are trying to hold back urine. Then relax for 5 seconds. Gradually work up to squeezing for 10 seconds. Do 3 sets of 15 repetitions a day, or as directed.    Follow up with your doctor as directed:  Write down your questions so you remember to ask them during your visits.  © Copyright [employer] 2023 Information is for End User's use only and may not be sold, redistributed or otherwise used for commercial purposes.  The above  information is an  only. It is not intended as medical advice for individual conditions or treatments. Talk to your doctor, nurse or pharmacist before following any medical regimen to see if it is safe and effective for you.      Follow up with PCP in 3-5 days.  Proceed to  ER if symptoms worsen.    Chief Complaint     Chief Complaint   Patient presents with    Possible UTI     Pain on urination, with bladder pressure          History of Present Illness     HPI    Review of Systems     Review of Systems   Constitutional:  Positive for chills. Negative for fever.   Genitourinary:  Positive for dysuria, frequency, pelvic pain and urgency.   All other systems reviewed and are negative.        Current Medications       Current Outpatient Medications:     cephalexin (KEFLEX) 500 mg capsule, Take 1 capsule (500 mg total) by mouth 2 (two) times a day for 5 days, Disp: 10 capsule, Rfl: 0    Cromolyn Sodium (NASAL ALLERGY NA), into each nostril daily as needed , Disp: , Rfl:     Specialty Vitamins Products (magnesium, amino acid chelate,) 133 MG tablet, Take 250 mg by mouth daily (Patient not taking: Reported on 5/18/2024), Disp: , Rfl:     Current Allergies     Allergies as of 05/18/2024 - Reviewed 05/18/2024   Allergen Reaction Noted    Methocarbamol Arthralgia 01/16/2024    Corn-containing products - food allergy GI Intolerance and Myalgia 01/23/2024    Dust mite extract Sneezing 01/04/2021    Egg solids, whole Other (See Comments) 01/16/2024    Gluten meal - food allergy Other (See Comments) 01/16/2024    Phenothiazines Other (See Comments) 09/13/2007    Prednisone Throat Swelling 04/18/2024    Thorazine [chlorpromazine] Other (See Comments) 06/13/2017    Tilactase Other (See Comments) 01/16/2024    Wheat bran - food allergy Other (See Comments) 01/16/2024              The following portions of the patient's history were reviewed and updated as appropriate: allergies, current medications, past family  "history, past medical history, past social history, past surgical history and problem list.     Past Medical History:   Diagnosis Date    Anxiety     Depression     GERD (gastroesophageal reflux disease)     House dust mite allergy     Hyperlipidemia     Migraine     Palpitations        Past Surgical History:   Procedure Laterality Date    BREAST BIOPSY Bilateral     benign on each side    COLONOSCOPY      TONSILLECTOMY Bilateral 10/4/2022    Procedure: TONSILLECTOMY;  Surgeon: Roland Tapia DO;  Location: [facility];  Service: ENT    WISDOM TOOTH EXTRACTION         Family History   Problem Relation Age of Onset    Hyperlipidemia Mother     Fibromyalgia Mother     Rheum arthritis Mother     Cancer Father          Medications have been verified.        Objective     /56   Pulse 71   Temp 99.1 °F (37.3 °C)   Resp 20   Ht 5' 4\" (1.626 m)   Wt 55.5 kg (122 lb 6.4 oz)   LMP 05/03/2024 (Approximate)   SpO2 99%   BMI 21.01 kg/m²   Patient's last menstrual period was 05/03/2024 (approximate).         Physical Exam     Physical Exam  Vitals and nursing note reviewed.   Constitutional:       General: She is not in acute distress.     Appearance: Normal appearance. She is well-developed and well-groomed. She is not toxic-appearing or diaphoretic.   HENT:      Head: Normocephalic and atraumatic.   Eyes:      Pupils: Pupils are equal, round, and reactive to light.   Pulmonary:      Effort: Pulmonary effort is normal. No respiratory distress.   Abdominal:      General: There is no distension.      Palpations: Abdomen is soft.      Tenderness: There is abdominal tenderness in the suprapubic area. There is no right CVA tenderness or left CVA tenderness.   Musculoskeletal:         General: Normal range of motion.      Cervical back: Normal range of motion and neck supple.   Skin:     General: Skin is warm and dry.      Capillary Refill: Capillary refill takes less than 2 seconds.   Neurological:      General: No " focal deficit present.      Mental Status: She is alert and oriented to person, place, and time.   Psychiatric:         Mood and Affect: Mood and affect normal.         Behavior: Behavior normal. Behavior is cooperative.         Thought Content: Thought content normal.         Judgment: Judgment normal.

## 2024-05-18 NOTE — PATIENT INSTRUCTIONS
Urinary Tract Infection in Women   AMBULATORY CARE:   A urinary tract infection (UTI)  is caused by bacteria that get inside your urinary tract. Your urinary tract includes your kidneys, ureters, bladder, and urethra. A UTI is more common in your lower urinary tract, which includes your bladder and urethra.       Common symptoms include the following:   Urinating more often or waking from sleep to urinate    Pain or burning when you urinate    Pain or pressure in your lower abdomen and back    Urine that smells bad    Blood in your urine    Leaking urine    Seek care immediately if:   You are urinating very little or not at all.    You have a high fever with shaking chills.    You have side or back pain that gets worse.    Call your doctor if:   You have a fever.    You do not feel better after 2 days of taking antibiotics.    You have new symptoms, such as blood or pus in your urine.    You are vomiting.    You have questions or concerns about your condition or care.    Treatment for a UTI  may include antibiotics to treat a bacterial infection. You may also need medicines to decrease pain and burning, or decrease the urge to urinate often. If you have UTIs often (called recurrent UTIs), you may be given antibiotics to take regularly. You will be given directions for when and how to use antibiotics. The goal is to prevent UTIs but not cause antibiotic resistance by using antibiotics too often.  Prevent a UTI:   Empty your bladder often.  Urinate and empty your bladder as soon as you feel the need. Do not hold your urine for long periods of time.    Wipe from front to back after you urinate or have a bowel movement.  This will help prevent germs from getting into your urinary tract through your urethra.    Drink liquids as directed.  Ask how much liquid to drink each day and which liquids are best for you. You may need to drink more liquids than usual to help flush out the bacteria. Do not drink alcohol, caffeine,  or citrus juices. These can irritate your bladder and increase your symptoms. Your healthcare provider may recommend cranberry juice to help prevent a UTI.    Urinate before and after you have sex.  This can help flush out bacteria passed during sex.    Do not douche or use feminine deodorants.  These can change the chemical balance in your vagina.    Change sanitary pads or tampons often.  This will help prevent germs from getting into your urinary tract.    Talk to your healthcare provider about your birth control method.  You may need to change your method if it is increasing your risk for UTIs.    Wear cotton underwear and clothes that are loose.  Tight pants and nylon underwear can trap moisture and cause bacteria to grow.    Vaginal estrogen may be recommended.  This medicine helps prevent UTIs in women who have gone through menopause or are in twila-menopause.    Do pelvic muscle exercises often.  Pelvic muscle exercises may help you start and stop urinating. Strong pelvic muscles may help you empty your bladder easier. Squeeze these muscles tightly for 5 seconds like you are trying to hold back urine. Then relax for 5 seconds. Gradually work up to squeezing for 10 seconds. Do 3 sets of 15 repetitions a day, or as directed.    Follow up with your doctor as directed:  Write down your questions so you remember to ask them during your visits.  © Copyright [employer] 2023 Information is for End User's use only and may not be sold, redistributed or otherwise used for commercial purposes.  The above information is an  only. It is not intended as medical advice for individual conditions or treatments. Talk to your doctor, nurse or pharmacist before following any medical regimen to see if it is safe and effective for you.

## 2024-05-21 LAB — BACTERIA UR CULT: ABNORMAL

## 2024-06-06 ENCOUNTER — OFFICE VISIT (OUTPATIENT)
Dept: URGENT CARE | Facility: CLINIC | Age: 39
End: 2024-06-06
Payer: COMMERCIAL

## 2024-06-06 VITALS
OXYGEN SATURATION: 99 % | TEMPERATURE: 98.3 F | HEART RATE: 81 BPM | SYSTOLIC BLOOD PRESSURE: 110 MMHG | DIASTOLIC BLOOD PRESSURE: 56 MMHG | RESPIRATION RATE: 18 BRPM

## 2024-06-06 DIAGNOSIS — N39.0 ACUTE UTI: Primary | ICD-10-CM

## 2024-06-06 LAB
SL AMB  POCT GLUCOSE, UA: NEGATIVE
SL AMB LEUKOCYTE ESTERASE,UA: ABNORMAL
SL AMB POCT BILIRUBIN,UA: NEGATIVE
SL AMB POCT BLOOD,UA: NEGATIVE
SL AMB POCT CLARITY,UA: ABNORMAL
SL AMB POCT COLOR,UA: ABNORMAL
SL AMB POCT KETONES,UA: NEGATIVE
SL AMB POCT NITRITE,UA: NEGATIVE
SL AMB POCT PH,UA: 7.5
SL AMB POCT SPECIFIC GRAVITY,UA: 1.01
SL AMB POCT URINE PROTEIN: NEGATIVE
SL AMB POCT UROBILINOGEN: 0.2

## 2024-06-06 PROCEDURE — 87147 CULTURE TYPE IMMUNOLOGIC: CPT | Performed by: PHYSICIAN ASSISTANT

## 2024-06-06 PROCEDURE — 99213 OFFICE O/P EST LOW 20 MIN: CPT | Performed by: PHYSICIAN ASSISTANT

## 2024-06-06 PROCEDURE — 87186 SC STD MICRODIL/AGAR DIL: CPT | Performed by: PHYSICIAN ASSISTANT

## 2024-06-06 PROCEDURE — 87086 URINE CULTURE/COLONY COUNT: CPT | Performed by: PHYSICIAN ASSISTANT

## 2024-06-06 PROCEDURE — 87077 CULTURE AEROBIC IDENTIFY: CPT | Performed by: PHYSICIAN ASSISTANT

## 2024-06-06 PROCEDURE — S9088 SERVICES PROVIDED IN URGENT: HCPCS | Performed by: PHYSICIAN ASSISTANT

## 2024-06-06 PROCEDURE — 81002 URINALYSIS NONAUTO W/O SCOPE: CPT | Performed by: PHYSICIAN ASSISTANT

## 2024-06-06 RX ORDER — CEPHALEXIN 500 MG/1
500 CAPSULE ORAL EVERY 12 HOURS SCHEDULED
Qty: 14 CAPSULE | Refills: 0 | Status: SHIPPED | OUTPATIENT
Start: 2024-06-06 | End: 2024-06-13

## 2024-06-06 NOTE — PROGRESS NOTES
[facility]        NAME: Katerin Muñiz is a 39 y.o. female  : 1985    MRN: [id number]  DATE: 2024  TIME: 8:48 AM    Assessment and Plan   Acute UTI [N39.0]  1. Acute UTI  POCT urine dip    Urine culture    Urine culture    cephalexin (KEFLEX) 500 mg capsule            Patient Instructions       Follow up with PCP in 3-5 days.  Proceed to  ER if symptoms worsen.    If tests have been performed at [facility], our office will contact you with results if changes need to be made to the care plan discussed with you at the visit.  You can review your full results on [facility].    Chief Complaint     Chief Complaint   Patient presents with    Possible UTI     C/o burning before urination, back pain, started yesterday, hx of interstitial cystitis         History of Present Illness       Patient is a 39 year old female presenting [facility] with urinary symptoms that began yesterday.  Patient began experiencing frequency w/ urination and burning.  Patient reports lower abdominal pain radiating into back.  No fevers.    Urinary Tract Infection   This is a new problem. The current episode started yesterday. The problem occurs every urination. The problem has been gradually worsening. The quality of the pain is described as burning. There has been no fever. Associated symptoms include frequency and urgency. Pertinent negatives include no chills, hematuria or vomiting.       Review of Systems   Review of Systems   Constitutional:  Negative for chills and fever.   HENT:  Negative for ear pain and sore throat.    Eyes:  Negative for pain and visual disturbance.   Respiratory:  Negative for cough and shortness of breath.    Cardiovascular:  Negative for chest pain and palpitations.   Gastrointestinal:  Negative for abdominal pain and vomiting.   Genitourinary:  Positive for dysuria, frequency and urgency. Negative for hematuria.   Musculoskeletal:  Negative for arthralgias and back pain.   Skin:   Negative for color change and rash.   Neurological:  Negative for seizures and syncope.   All other systems reviewed and are negative.        Current Medications       Current Outpatient Medications:     cephalexin (KEFLEX) 500 mg capsule, Take 1 capsule (500 mg total) by mouth every 12 (twelve) hours for 7 days, Disp: 14 capsule, Rfl: 0    Cromolyn Sodium (NASAL ALLERGY NA), into each nostril daily as needed , Disp: , Rfl:     Specialty Vitamins Products (magnesium, amino acid chelate,) 133 MG tablet, Take 250 mg by mouth daily (Patient not taking: Reported on 5/18/2024), Disp: , Rfl:     Current Allergies     Allergies as of 06/06/2024 - Reviewed 06/06/2024   Allergen Reaction Noted    Methocarbamol Arthralgia 01/16/2024    Corn-containing products - food allergy GI Intolerance and Myalgia 01/23/2024    Dust mite extract Sneezing 01/04/2021    Egg solids, whole Other (See Comments) 01/16/2024    Gluten meal - food allergy Other (See Comments) 01/16/2024    Phenothiazines Other (See Comments) 09/13/2007    Prednisone Throat Swelling 04/18/2024    Thorazine [chlorpromazine] Other (See Comments) 06/13/2017    Tilactase Other (See Comments) 01/16/2024    Wheat bran - food allergy Other (See Comments) 01/16/2024            The following portions of the patient's history were reviewed and updated as appropriate: allergies, current medications, past family history, past medical history, past social history, past surgical history and problem list.     Past Medical History:   Diagnosis Date    Anxiety     Depression     GERD (gastroesophageal reflux disease)     House dust mite allergy     Hyperlipidemia     Migraine     Palpitations        Past Surgical History:   Procedure Laterality Date    BREAST BIOPSY Bilateral     benign on each side    COLONOSCOPY      TONSILLECTOMY Bilateral 10/4/2022    Procedure: TONSILLECTOMY;  Surgeon: Roland Tapia DO;  Location: [facility];  Service: ENT    WISDOM TOOTH EXTRACTION          Family History   Problem Relation Age of Onset    Hyperlipidemia Mother     Fibromyalgia Mother     Rheum arthritis Mother     Cancer Father          Medications have been verified.        Objective   /56   Pulse 81   Temp 98.3 °F (36.8 °C)   Resp 18   LMP 05/03/2024 (Approximate)   SpO2 99%   Patient's last menstrual period was 05/03/2024 (approximate).       Physical Exam     Physical Exam  Constitutional:       Appearance: Normal appearance.   HENT:      Head: Normocephalic and atraumatic.      Nose: Nose normal.      Mouth/Throat:      Mouth: Mucous membranes are moist.   Eyes:      Extraocular Movements: Extraocular movements intact.      Conjunctiva/sclera: Conjunctivae normal.      Pupils: Pupils are equal, round, and reactive to light.   Cardiovascular:      Rate and Rhythm: Normal rate.   Pulmonary:      Effort: Pulmonary effort is normal.   Abdominal:      Tenderness: There is abdominal tenderness (Suprapubic tenderness upon palpation.). There is no right CVA tenderness or left CVA tenderness.   Musculoskeletal:         General: Normal range of motion.      Cervical back: Normal range of motion and neck supple.   Skin:     General: Skin is warm and dry.      Capillary Refill: Capillary refill takes less than 2 seconds.   Neurological:      General: No focal deficit present.      Mental Status: She is alert and oriented to person, place, and time.   Psychiatric:         Mood and Affect: Mood normal.         Behavior: Behavior normal.

## 2024-06-08 LAB
BACTERIA UR CULT: ABNORMAL
BACTERIA UR CULT: ABNORMAL

## 2024-09-25 ENCOUNTER — HOSPITAL ENCOUNTER (OUTPATIENT)
Dept: RADIOLOGY | Facility: HOSPITAL | Age: 39
Discharge: HOME | End: 2024-09-25
Payer: COMMERCIAL

## 2024-09-25 DIAGNOSIS — R09.1 PLEURISY: ICD-10-CM

## 2024-09-25 DIAGNOSIS — R09.89 ABNORMAL LUNG SOUNDS: ICD-10-CM

## 2024-09-25 PROCEDURE — 71046 X-RAY EXAM CHEST 2 VIEWS: CPT

## (undated) DEVICE — STERILE BETHLEHEM T AND A PACK: Brand: CARDINAL HEALTH

## (undated) DEVICE — MEDI-VAC NON-CONDUCTIVE SUCTION TUBING 6MM X 1.8M (6FT.) L: Brand: CARDINAL HEALTH

## (undated) DEVICE — SCD SEQUENTIAL COMPRESSION COMFORT SLEEVE MEDIUM KNEE LENGTH: Brand: KENDALL SCD

## (undated) DEVICE — MEDI-VAC YANKAUER SUCTION HANDLE W/BULBOUS AND CONTROL VENT: Brand: CARDINAL HEALTH

## (undated) DEVICE — TOWEL SET X-RAY

## (undated) DEVICE — SKIN MARKER DUAL TIP WITH RULER CAP, FLEXIBLE RULER AND LABELS: Brand: DEVON

## (undated) DEVICE — GLOVE SRG BIOGEL ORTHOPEDIC 7

## (undated) DEVICE — WAND COBLATION  PROCISE XP TONSIL

## (undated) DEVICE — AIRLIFE™ TRI-FLO™ SUCTION CATHETER WITH CONTROL PORT: Brand: AIRLIFE™

## (undated) DEVICE — ANTI-FOG SOLUTION WITH FOAM PAD: Brand: DEVON